# Patient Record
Sex: MALE | Race: WHITE | NOT HISPANIC OR LATINO | Employment: OTHER | URBAN - METROPOLITAN AREA
[De-identification: names, ages, dates, MRNs, and addresses within clinical notes are randomized per-mention and may not be internally consistent; named-entity substitution may affect disease eponyms.]

---

## 2017-02-26 ENCOUNTER — TRANSFERRED RECORDS (OUTPATIENT)
Dept: HEALTH INFORMATION MANAGEMENT | Facility: CLINIC | Age: 67
End: 2017-02-26

## 2017-02-28 ENCOUNTER — TRANSFERRED RECORDS (OUTPATIENT)
Dept: HEALTH INFORMATION MANAGEMENT | Facility: CLINIC | Age: 67
End: 2017-02-28

## 2017-03-15 ENCOUNTER — TRANSFERRED RECORDS (OUTPATIENT)
Dept: HEALTH INFORMATION MANAGEMENT | Facility: CLINIC | Age: 67
End: 2017-03-15

## 2017-05-17 ENCOUNTER — PRE VISIT (OUTPATIENT)
Dept: UROLOGY | Facility: CLINIC | Age: 67
End: 2017-05-17

## 2017-05-17 NOTE — TELEPHONE ENCOUNTER
Patient with prostate cancer coming in for a consult. Chart reviewed and all records available. No need for a call.

## 2017-05-19 PROCEDURE — 00000346 ZZHCL STATISTIC REVIEW OUTSIDE SLIDES TC 88321: Performed by: UROLOGY

## 2017-05-23 LAB — COPATH REPORT: NORMAL

## 2017-05-24 ENCOUNTER — ALLIED HEALTH/NURSE VISIT (OUTPATIENT)
Dept: UROLOGY | Facility: CLINIC | Age: 67
End: 2017-05-24

## 2017-05-24 ENCOUNTER — OFFICE VISIT (OUTPATIENT)
Dept: SURGERY | Facility: CLINIC | Age: 67
End: 2017-05-24

## 2017-05-24 ENCOUNTER — ANESTHESIA EVENT (OUTPATIENT)
Dept: SURGERY | Facility: CLINIC | Age: 67
DRG: 708 | End: 2017-05-24
Payer: MEDICARE

## 2017-05-24 ENCOUNTER — OFFICE VISIT (OUTPATIENT)
Dept: UROLOGY | Facility: CLINIC | Age: 67
End: 2017-05-24

## 2017-05-24 ENCOUNTER — ALLIED HEALTH/NURSE VISIT (OUTPATIENT)
Dept: SURGERY | Facility: CLINIC | Age: 67
End: 2017-05-24

## 2017-05-24 ENCOUNTER — HOSPITAL ENCOUNTER (OUTPATIENT)
Dept: MRI IMAGING | Facility: CLINIC | Age: 67
Discharge: HOME | End: 2017-05-24
Attending: UROLOGY | Admitting: UROLOGY
Payer: MEDICARE

## 2017-05-24 VITALS
WEIGHT: 229.5 LBS | DIASTOLIC BLOOD PRESSURE: 82 MMHG | HEART RATE: 102 BPM | BODY MASS INDEX: 31.08 KG/M2 | SYSTOLIC BLOOD PRESSURE: 152 MMHG | TEMPERATURE: 99.2 F | OXYGEN SATURATION: 92 % | HEIGHT: 72 IN | RESPIRATION RATE: 16 BRPM

## 2017-05-24 VITALS
HEIGHT: 72 IN | DIASTOLIC BLOOD PRESSURE: 89 MMHG | SYSTOLIC BLOOD PRESSURE: 150 MMHG | BODY MASS INDEX: 30.98 KG/M2 | WEIGHT: 228.7 LBS | HEART RATE: 78 BPM

## 2017-05-24 DIAGNOSIS — C61 MALIGNANT NEOPLASM OF PROSTATE (H): Primary | ICD-10-CM

## 2017-05-24 DIAGNOSIS — Z01.818 PREOP EXAMINATION: Primary | ICD-10-CM

## 2017-05-24 DIAGNOSIS — C61 MALIGNANT NEOPLASM OF PROSTATE (H): ICD-10-CM

## 2017-05-24 DIAGNOSIS — C61 PROSTATE CANCER (H): ICD-10-CM

## 2017-05-24 LAB
ALBUMIN UR-MCNC: NEGATIVE MG/DL
ANION GAP SERPL CALCULATED.3IONS-SCNC: 6 MMOL/L (ref 3–14)
APPEARANCE UR: CLEAR
BILIRUB UR QL STRIP: NEGATIVE
BUN SERPL-MCNC: 20 MG/DL (ref 7–30)
CALCIUM SERPL-MCNC: 9.7 MG/DL (ref 8.5–10.1)
CHLORIDE SERPL-SCNC: 105 MMOL/L (ref 94–109)
CO2 SERPL-SCNC: 29 MMOL/L (ref 20–32)
COLOR UR AUTO: YELLOW
CREAT SERPL-MCNC: 1.14 MG/DL (ref 0.66–1.25)
ERYTHROCYTE [DISTWIDTH] IN BLOOD BY AUTOMATED COUNT: 13.1 % (ref 10–15)
GFR SERPL CREATININE-BSD FRML MDRD: 64 ML/MIN/1.7M2
GLUCOSE SERPL-MCNC: 123 MG/DL (ref 70–99)
GLUCOSE UR STRIP-MCNC: 50 MG/DL
HCT VFR BLD AUTO: 47.3 % (ref 40–53)
HGB BLD-MCNC: 15.5 G/DL (ref 13.3–17.7)
HGB UR QL STRIP: NEGATIVE
KETONES UR STRIP-MCNC: NEGATIVE MG/DL
LEUKOCYTE ESTERASE UR QL STRIP: NEGATIVE
MCH RBC QN AUTO: 29.6 PG (ref 26.5–33)
MCHC RBC AUTO-ENTMCNC: 32.8 G/DL (ref 31.5–36.5)
MCV RBC AUTO: 90 FL (ref 78–100)
NITRATE UR QL: NEGATIVE
PH UR STRIP: 5 PH (ref 5–7)
PLATELET # BLD AUTO: 209 10E9/L (ref 150–450)
POTASSIUM SERPL-SCNC: 4.4 MMOL/L (ref 3.4–5.3)
RBC # BLD AUTO: 5.24 10E12/L (ref 4.4–5.9)
SODIUM SERPL-SCNC: 140 MMOL/L (ref 133–144)
SP GR UR STRIP: 1.02 (ref 1–1.03)
URN SPEC COLLECT METH UR: ABNORMAL
UROBILINOGEN UR STRIP-MCNC: 0 MG/DL (ref 0–2)
WBC # BLD AUTO: 9.1 10E9/L (ref 4–11)

## 2017-05-24 PROCEDURE — 25000128 H RX IP 250 OP 636: Performed by: UROLOGY

## 2017-05-24 PROCEDURE — A9585 GADOBUTROL INJECTION: HCPCS | Performed by: UROLOGY

## 2017-05-24 PROCEDURE — 72197 MRI PELVIS W/O & W/DYE: CPT

## 2017-05-24 RX ORDER — HEPARIN SODIUM 5000 [USP'U]/.5ML
5000 INJECTION, SOLUTION INTRAVENOUS; SUBCUTANEOUS ONCE
Status: CANCELLED | OUTPATIENT
Start: 2017-05-24 | End: 2017-05-24

## 2017-05-24 RX ORDER — LISINOPRIL/HYDROCHLOROTHIAZIDE 10-12.5 MG
1 TABLET ORAL EVERY MORNING
COMMUNITY
End: 2022-01-07 | Stop reason: ALTCHOICE

## 2017-05-24 RX ORDER — CEFAZOLIN SODIUM 1 G/3ML
1 INJECTION, POWDER, FOR SOLUTION INTRAMUSCULAR; INTRAVENOUS SEE ADMIN INSTRUCTIONS
Status: CANCELLED | OUTPATIENT
Start: 2017-05-24

## 2017-05-24 RX ORDER — GADOBUTROL 604.72 MG/ML
10 INJECTION INTRAVENOUS ONCE
Status: COMPLETED | OUTPATIENT
Start: 2017-05-24 | End: 2017-05-24

## 2017-05-24 RX ADMIN — GADOBUTROL 10 ML: 604.72 INJECTION INTRAVENOUS at 19:09

## 2017-05-24 RX ADMIN — SODIUM CHLORIDE 100 ML: 9 INJECTION, SOLUTION INTRAVENOUS at 19:09

## 2017-05-24 ASSESSMENT — PAIN SCALES - GENERAL: PAINLEVEL: NO PAIN (0)

## 2017-05-24 ASSESSMENT — LIFESTYLE VARIABLES: TOBACCO_USE: 1

## 2017-05-24 NOTE — PATIENT INSTRUCTIONS
Preparing for Your Surgery      Name:  Tunde Albarado   MRN:  [id number]   :  1950   Today's Date:  2017     Arriving for surgery:  Surgery date:  17  Surgery time:  7:45 am  Arrival time:  5:45 am    Please come to:   [facility] [address]  [address] [place]  [address]    -   parking is available in front of the hospital from 5:15 am to 8:00 pm    -  Stop at the Information Desk in the lobby    -   Inform the information person that you are here for surgery. An escort to 3c will be provided. If you would not like an escort, please proceed to 3C on the 3rd floor. [phone]   -  Bring your ID and insurance card.    What can I eat or drink?  -  Clear liquids the day prior to surgery.  -  You may have water, apple juice, BLACK coffee (NO creamer or nondairy creamer), or 7up/Sprite until 2 hours prior to your surgery. (5:45 am)    Which medicines can I take?      -  Do not bring your own medications to the hospital.        -  Stop Aspirin 1 week prior to surgery.        -  Follow Urology instructions regarding Ibuprofen. If no instructions given, NO Ibuprofen the day prior to surgery.  -  Do NOT take these medications in the morning, the day of surgery:  Lisinopril-Hydrochlorothiazide    -  Please take these medications the morning of surgery:  Acetaminophen (Tylenol) if needed    How do I prepare myself?        -  Fleets enema day of surgery prior to arrival.  -  Take two showers: one the night before surgery; and one the morning of surgery.         Use Scrubcare or Hibiclens to wash from neck down.  You may use your own shampoo and conditioner. No other hair products.   -  Do NOT use lotion, powder, deodorant, or antiperspirant the day of your surgery.  -  Do NOT wear any jewelry.    Questions or Concerns:  If you have questions or concerns, please call the  Preoperative Assessment Center, Monday-Friday 7AM-7PM:  [phone]    AFTER YOUR  SURGERY  Breathing exercises   Breathing exercises help you recover faster. Take deep breaths and let the air out slowly. This will:     Help you wake up after surgery.    Help prevent complications like pneumonia.  Preventing complications will help you go home sooner.   We may give you a breathing device (incentive spirometer) to encourage you to breathe deeply.   Nausea and vomiting   You may feel sick to your stomach after surgery; if so, let your nurse know.    Pain control:  After surgery, you may have pain. Our goal is to help you manage your pain. Pain medicine will help you feel comfortable enough to do activities that will help you heal.  These activities may include breathing exercises, walking and physical therapy.   To help your health care team treat your pain we will ask: 1) If you have pain  2) where it is located 3) describe your pain in your words  Methods of pain control include medications given by mouth, vein or by nerve block for some surgeries.  We may give you a pain control pump that will:  1) Deliver the medicine through a tube placed in your vein  2) Control the amount of medicine you receive  3) Allow you to push a button to deliver a dose of pain medicine  Sequential Compression Device (SCD) or Pneumo Boots:  You may need to wear SCD S on your legs or feet. These are wraps connected to a machine that pumps in air and releases it. The repeated pumping helps prevent blood clots from forming.

## 2017-05-24 NOTE — MR AVS SNAPSHOT
After Visit Summary   5/24/2017    Tunde Albarado    MRN: [id number]           Patient Information     Date Of Birth          1950        Visit Information        Provider Department      5/24/2017 9:40 AM Fanny Peace MD [facility] Urology and [facility] for Prostate and Urologic Cancers        Today's Diagnoses     Malignant neoplasm of prostate (H)    -  1       Follow-ups after your visit        Additional Services     PAC Visit Referral (For [facility] Only)       Does this visit require an Anesthesia consult?  Yes - Evaluate for medical necessity related to one of the following conditions:      H&P done by:  N/A and Other (Specify): PAC      Please be aware that coverage of these services is subject to the terms and limitations of your health insurance plan.  Call member services at your health plan with any benefit or coverage questions.      Please bring the following to your appointment:  >>   Any x-rays, CTs or MRIs which have been performed.  Contact the facility where they were done to arrange for  prior to your scheduled appointment.  Any new CT, MRI or other procedures ordered by your specialist must be performed at a [place] facility or coordinated by your clinic's referral office.    >>   List of current medications  >>   This referral request   >>   Any documents/labs given to you for this referral                  Follow-up notes from your care team     Return in about 4 weeks (around 6/21/2017).      Your next 10 appointments already scheduled     May 24, 2017  7:00 PM CDT   MR PROSTATE with UUMR2   [facility], [place], MRI ([facility])    [address] [facility] [id number] [phone]           Take your medicines as usual, unless your doctor tells you not to. Bring a list of your current medicines to your exam (including vitamins, minerals and over-the-counter drugs).  You will be given intravenous contrast for  this exam. To prepare:   The day before your exam, drink extra fluids at least six 8-ounce glasses (unless your doctor tells you to restrict your fluids).   Have a blood test (creatinine test) within 30 days of your exam. Go to your clinic or Diagnostic Imaging Department for this test.  The MRI machine uses a strong magnet. Please wear clothes without metal (snaps, zippers). A sweatsuit works well, or we may give you a hospital gown.  Please remove any body piercings and hair extensions before you arrive. You will also remove watches, jewelry, hairpins, wallets, dentures, partial dental plates and hearing aids. You may wear contact lenses, and you may be able to wear your rings. We have a safe place to keep your personal items, but it is safer to leave them at home.   **IMPORTANT** THE INSTRUCTIONS BELOW ARE ONLY FOR THOSE PATIENTS WHO HAVE BEEN TOLD THEY WILL RECEIVE SEDATION OR GENERAL ANESTHESIA DURING THEIR MRI PROCEDURE:  IF YOU WILL RECEIVE SEDATION (take medicine to help you relax during your exam):   You must get the medicine from your doctor before you arrive. Bring the medicine to the exam. Do not take it at home.   Arrive one hour early. Bring someone who can take you home after the test. Your medicine will make you sleepy. After the exam, you may not drive, take a bus or take a taxi by yourself.   No eating 8 hours before your exam. You may have clear liquids up until 4 hours before your exam. (Clear liquids include water, clear tea, black coffee and fruit juice without pulp.)  IF YOU WILL RECEIVE ANESTHESIA (be asleep for your exam):   Arrive 1 1/2 hours early. Bring someone who can take you home after the test. You may not drive, take a bus or take a taxi by yourself.   No eating 8 hours before your exam. You may have clear liquids up until 4 hours before your exam. (Clear liquids include water, clear tea, black coffee and fruit juice without pulp.)  Please call the Imaging Department at your exam  site with any questions.            2017  2:30 PM CDT   XR CYSTOGRAM with UCXR2, UC GIGU RAD   [facility]ray ([facility])    [address] [address] [phone]           Please bring a list of your current medicines to your exam. (Include vitamins, minerals and over-thecounter medicines.) Leave your valuables at home.  Tell your doctor if there is a chance you may be pregnant.  You do not need to do anything special for this exam.              Future tests that were ordered for you today     Open Future Orders        Priority Expected Expires Ordered    X-Ray Cystogram Routine 2017    MRI Prostate Routine 2017            Who to contact     Please call your clinic at [phone] to:    Ask questions about your health    Make or cancel appointments    Discuss your medicines    Learn about your test results    Speak to your doctor   If you have compliments or concerns about an experience at your clinic, or if you wish to file a complaint, please contact [facility] Physicians Patient Relations at [phone] or email us at [email or web address]         Additional Information About Your Visit        Ziffi Information     Ziffi is an electronic gateway that provides easy, online access to your medical records. With Ziffi, you can request a clinic appointment, read your test results, renew a prescription or communicate with your care team.     To sign up for [employer] visit the website at [email or web address]   You will be asked to enter the access code listed below, as well as some personal information. Please follow the directions to create your username and password.     Your access code is: [email or web address]  Expires: 2017  2:32 PM     Your access code will  in 90 days. If you need help or a new code, please contact your [facility]  [place] [facility] or call [phone] for assistance.        Care EveryWhere ID     This is your Care EveryWhere ID. This could be used by other organizations to access your [place] medical records  [id number]        Your Vitals Were     Pulse Height BMI (Body Mass Index)             78 1.829 m (6') 31.02 kg/m2          Blood Pressure from Last 3 Encounters:   05/24/17 150/89    Weight from Last 3 Encounters:   05/24/17 103.7 kg (228 lb 11.2 oz)              We Performed the Following     PAC Visit Referral (For [facility] Only)     Bonnie-Operative Worksheet        Primary Care Provider Office Phone # Fax #    Mayur Hinds [phone]       [place] [id number]        Thank you!     Thank you for choosing [facility] UROLOGY AND [facility] FOR PROSTATE AND UROLOGIC CANCERS  for your care. Our goal is always to provide you with excellent care. Hearing back from our patients is one way we can continue to improve our services. Please take a few minutes to complete the written survey that you may receive in the mail after your visit with us. Thank you!             Your Updated Medication List - Protect others around you: Learn how to safely use, store and throw away your medicines at www.disposemymeds.org.          This list is accurate as of: 5/24/17 11:26 AM.  Always use your most recent med list.                   Brand Name Dispense Instructions for use    ASPIRIN PO      Take 81 mg by mouth       lisinopril-hydrochlorothiazide 10-12.5 MG per tablet    PRINZIDE/ZESTORETIC     Take 1 tablet by mouth daily

## 2017-05-24 NOTE — MR AVS SNAPSHOT
After Visit Summary   2017    Tunde Albarado    MRN: [id number]           Patient Information     Date Of Birth          1950        Visit Information        Provider Department      2017 4:00 PM Rn, [facility] Preoperative Assessment Center        Care Instructions      Preparing for Your Surgery      Name:  Tunde Albarado   MRN:  [id number]   :  1950   Today's Date:  2017     Arriving for surgery:  Surgery date:  17  Surgery time:  7:45 am  Arrival time:  5:45 am    Please come to:   [facility] [address]  [address] [place]  [address]    -   parking is available in front of the hospital from 5:15 am to 8:00 pm    -  Stop at the Information Desk in the lobby    -   Inform the information person that you are here for surgery. An escort to 3c will be provided. If you would not like an escort, please proceed to 3C on the 3rd floor. [phone]   -  Bring your ID and insurance card.    What can I eat or drink?  -  Clear liquids the day prior to surgery.  -  You may have water, apple juice, BLACK coffee (NO creamer or nondairy creamer), or 7up/Sprite until 2 hours prior to your surgery. (5:45 am)    Which medicines can I take?      -  Do not bring your own medications to the hospital.        -  Stop Aspirin 1 week prior to surgery.        -  Follow Urology instructions regarding Ibuprofen. If no instructions given, NO Ibuprofen the day prior to surgery.  -  Do NOT take these medications in the morning, the day of surgery:  Lisinopril-Hydrochlorothiazide    -  Please take these medications the morning of surgery:  Acetaminophen (Tylenol) if needed    How do I prepare myself?        -  Fleets enema day of surgery prior to arrival.  -  Take two showers: one the night before surgery; and one the morning of surgery.         Use Scrubcare or Hibiclens to wash from neck down.  You may use your own shampoo and conditioner. No  other hair products.   -  Do NOT use lotion, powder, deodorant, or antiperspirant the day of your surgery.  -  Do NOT wear any jewelry.    Questions or Concerns:  If you have questions or concerns, please call the  Preoperative Assessment Center, Monday-Friday 7AM-7PM:  [phone]    AFTER YOUR SURGERY  Breathing exercises   Breathing exercises help you recover faster. Take deep breaths and let the air out slowly. This will:     Help you wake up after surgery.    Help prevent complications like pneumonia.  Preventing complications will help you go home sooner.   We may give you a breathing device (incentive spirometer) to encourage you to breathe deeply.   Nausea and vomiting   You may feel sick to your stomach after surgery; if so, let your nurse know.    Pain control:  After surgery, you may have pain. Our goal is to help you manage your pain. Pain medicine will help you feel comfortable enough to do activities that will help you heal.  These activities may include breathing exercises, walking and physical therapy.   To help your health care team treat your pain we will ask: 1) If you have pain  2) where it is located 3) describe your pain in your words  Methods of pain control include medications given by mouth, vein or by nerve block for some surgeries.  We may give you a pain control pump that will:  1) Deliver the medicine through a tube placed in your vein  2) Control the amount of medicine you receive  3) Allow you to push a button to deliver a dose of pain medicine  Sequential Compression Device (SCD) or Pneumo Boots:  You may need to wear SCD S on your legs or feet. These are wraps connected to a machine that pumps in air and releases it. The repeated pumping helps prevent blood clots from forming.                   Follow-ups after your visit        Your next 10 appointments already scheduled     May 24, 2017  4:00 PM CDT   (Arrive by 3:45 PM)   PAC RN ASSESSMENT with  Pac Rn   [facility] Preoperative  Assessment Center ([facility] [place])    [address] [facility]  4th Floor  [facility] [address]   [phone]            May 24, 2017  4:30 PM CDT   (Arrive by 4:15 PM)   PAC Anesthesia Consult with  Pac Anesthesiologist   [facility] Preoperative Assessment Center ([facility] [place])    [address] [facility]  [address] [facility] [address]   [phone]            May 24, 2017  7:00 PM CDT   MR PROSTATE with UUMR2   [facility] [place])    [address] [facility] [address]   [phone]           Take your medicines as usual, unless your doctor tells you not to. Bring a list of your current medicines to your exam (including vitamins, minerals and over-the-counter drugs).  You will be given intravenous contrast for this exam. To prepare:   The day before your exam, drink extra fluids at least six 8-ounce glasses (unless your doctor tells you to restrict your fluids).   Have a blood test (creatinine test) within 30 days of your exam. Go to your clinic or Diagnostic Imaging Department for this test.  The MRI machine uses a strong magnet. Please wear clothes without metal (snaps, zippers). A sweatsuit works well, or we may give you a hospital gown.  Please remove any body piercings and hair extensions before you arrive. You will also remove watches, jewelry, hairpins, wallets, dentures, partial dental plates and hearing aids. You may wear contact lenses, and you may be able to wear your rings. We have a safe place to keep your personal items, but it is safer to leave them at home.   **IMPORTANT** THE INSTRUCTIONS BELOW ARE ONLY FOR THOSE PATIENTS WHO HAVE BEEN TOLD THEY WILL RECEIVE SEDATION OR GENERAL ANESTHESIA DURING THEIR MRI PROCEDURE:  IF YOU WILL RECEIVE SEDATION (take medicine to help you relax during your exam):   You must get the medicine from your doctor before you arrive. Bring the  medicine to the exam. Do not take it at home.   Arrive one hour early. Bring someone who can take you home after the test. Your medicine will make you sleepy. After the exam, you may not drive, take a bus or take a taxi by yourself.   No eating 8 hours before your exam. You may have clear liquids up until 4 hours before your exam. (Clear liquids include water, clear tea, black coffee and fruit juice without pulp.)  IF YOU WILL RECEIVE ANESTHESIA (be asleep for your exam):   Arrive 1 1/2 hours early. Bring someone who can take you home after the test. You may not drive, take a bus or take a taxi by yourself.   No eating 8 hours before your exam. You may have clear liquids up until 4 hours before your exam. (Clear liquids include water, clear tea, black coffee and fruit juice without pulp.)  Please call the Imaging Department at your exam site with any questions.            Jun 02, 2017   Procedure with Fanny Peace MD   [facility], [place], Same Day Surgery (--)    [address] [facility] [id number] [phone]            Jun 14, 2017  2:30 PM CDT   XR CYSTOGRAM with UCXR2, UC GIGU RAD   [facility] [place] Xray ([facility])    [address] [address] [phone]           Please bring a list of your current medicines to your exam. (Include vitamins, minerals and over-thecounter medicines.) Leave your valuables at home.  Tell your doctor if there is a chance you may be pregnant.  You do not need to do anything special for this exam.            Jun 14, 2017  3:30 PM CDT   (Arrive by 3:15 PM)   Post-Op with Bessy Echeverria MD   [facility] Urology and [facility] for Prostate and Urologic Cancers ([facility] [place])    [address] [facility]  4th [facility] [address] [phone]              Future tests that were ordered for you today     Open Future Orders        Priority Expected Expires Ordered    X-Ray Cystogram Routine  2017    MRI Prostate Routine 2017            Who to contact     Please call your clinic at [phone] to:    Ask questions about your health    Make or cancel appointments    Discuss your medicines    Learn about your test results    Speak to your doctor   If you have compliments or concerns about an experience at your clinic, or if you wish to file a complaint, please contact [facility] Physicians Patient Relations at [phone] or email us at [email or web address]         Additional Information About Your Visit        SocialbombharSkeeble Information     [employer] is an electronic gateway that provides easy, online access to your medical records. With [employer], you can request a clinic appointment, read your test results, renew a prescription or communicate with your care team.     To sign up for [employer] visit the website at [email or web address]   You will be asked to enter the access code listed below, as well as some personal information. Please follow the directions to create your username and password.     Your access code is: [email or web address]  Expires: 2017  2:32 PM     Your access code will  in 90 days. If you need help or a new code, please contact your [facility] Clinic or call [phone] for assistance.        Care EveryWhere ID     This is your Care EveryWhere ID. This could be used by other organizations to access your [place] medical records  [id number]         Blood Pressure from Last 3 Encounters:   17 152/82   17 150/89    Weight from Last 3 Encounters:   17 104.1 kg (229 lb 8 oz)   17 103.7 kg (228 lb 11.2 oz)              Today, you had the following     No orders found for display       Primary Care Provider Office Phone # Fax #    Mayur Hinds [phone]       [facility] [address] [facility] [address]        Thank you!     Thank you for  [facility] PREOPERATIVE ASSESSMENT CENTER  for your care. Our goal is always to provide you with excellent care. Hearing back from our patients is one way we can continue to improve our services. Please take a few minutes to complete the written survey that you may receive in the mail after your visit with us. Thank you!             Your Updated Medication List - Protect others around you: Learn how to safely use, store and throw away your medicines at www.disposemymeds.org.          This list is accurate as of: 5/24/17  3:26 PM.  Always use your most recent med list.                   Brand Name Dispense Instructions for use    ASPIRIN PO      Take 81 mg by mouth every morning       lisinopril-hydrochlorothiazide 10-12.5 MG per tablet    PRINZIDE/ZESTORETIC     Take 1 tablet by mouth every morning       SIMVASTATIN PO      Take 20 mg by mouth At Bedtime

## 2017-05-24 NOTE — H&P
Pre-Operative H & P     CC:  Preoperative exam to assess for increased cardiopulmonary risk while undergoing surgery and anesthesia.    Date of Encounter: 5/24/2017  Primary Care Physician:  Mayur Hinds  Tunde Albarado is a 67 year old male who presents for pre-operative H & P in preparation for Davinci assisted radical prostatectomy, bilateral pelvic lymphadenectomy with Dr. Peace on 6/2/17 at [facility]. History is obtained from the patient.     Patient with newly diagnosed prostate cancer, biopsy proven after rising PSA. Consult with Dr. Peace with above procedure now planned.    Past Medical History  Past Medical History:   Diagnosis Date     HLD (hyperlipidemia)      Hypertension      PONV (postoperative nausea and vomiting)      Prostate cancer (H)        Past Surgical History  Past Surgical History:   Procedure Laterality Date     COLONOSCOPY       HERNIA REPAIR, INGUINAL RT/LT      open       Hx of Blood transfusions/reactions: Denies.     Hx of abnormal bleeding or anti-platelet use: ASA 81 mg daily    Menstrual history: No LMP for male patient.    Steroid use in the last year: Denies.     Personal or FH with difficulty with Anesthesia:  PONV.    Prior to Admission Medications  Current Outpatient Prescriptions   Medication Sig Dispense Refill     lisinopril-hydrochlorothiazide (PRINZIDE/ZESTORETIC) 10-12.5 MG per tablet Take 1 tablet by mouth every morning        ASPIRIN PO Take 81 mg by mouth every morning        SIMVASTATIN PO Take 20 mg by mouth At Bedtime         Allergies  No Known Allergies    Social History  Social History     Social History     Marital status:      Spouse name: N/A     Number of children: N/A     Years of education: N/A     Occupational History     Not on file.     Social History Main Topics     Smoking status: Former Smoker     Packs/day: 0.50     Years: 32.00     Types: Cigarettes     Start date: 1968     Quit date:  "2000     Smokeless tobacco: Not on file     Alcohol use Yes      Comment: Social     Drug use: No     Sexual activity: Not on file     Other Topics Concern     Not on file     Social History Narrative       Family History  Family History   Problem Relation Age of Onset     Prostate Cancer Brother      Pancreatic Cancer Father        Review of Systems  The complete review of systems is negative other than noted in the HPI or here.   Constitutional: Denies fever, chills, weight loss.  Skin: Chronic skin lesions. Occasional bleeding. Has never had excision by Derm.   HEENT: Wears glasses for vision.  Respiratory: Denies cough or shortness of breath. Denies concern for SARAI.  CV: Denies chest pain or irregular HR. Good activity tolerance.  GI: Denies abdominal pain or bowel issues.  : Denies dysuria.    Temp: 99.2  F (37.3  C) Temp src: Oral BP: 152/82 Pulse: 102   Resp: 16 SpO2: 92 %         229 lbs 8 oz  6' 0\"   Body mass index is 31.13 kg/(m^2).       Physical Exam  Constitutional: Awake, alert, cooperative, no apparent distress, and appears stated age. Accompanied by son.  Eyes: Pupils equal, round and reactive to light, extra ocular muscles intact, sclera clear, conjunctiva normal. Glasses on.  HENT: Normocephalic, oral pharynx with moist mucus membranes, good dentition. No goiter appreciated.   Respiratory: Clear to auscultation bilaterally, no crackles or wheezing. No cough or obvious dyspnea.  Cardiovascular: Regular rate and rhythm, normal S1 and S2, and no murmur noted. Carotids, no bruits. No edema. Palpable pulses to radial  DP and PT arteries.   GI: Normal bowel sounds, soft, non-distended, non-tender, no masses palpated. Difficult exam due to obese abdomen. Patient's abdomen is covered with dry plaque-like skin lesions which he reports as chronic. Very rare bleeding. Dr. Peace examined abdomen today also.  Lymph/Hematologic: No cervical lymphadenopathy and no supraclavicular " lymphadenopathy.  Genitourinary:  Deferred.  Skin: Warm and dry. Face gracia.  Musculoskeletal: Mildly limited neck extension. There is no redness, warmth, or swelling of the joints. Gross motor strength is normal.    Neurologic: Awake, alert, oriented to name, place and time. Cranial nerves II-XII are grossly intact. Gait is normal.   Neuropsychiatric: Calm, cooperative. Normal affect.     Labs: (personally reviewed)  Lab Results   Component Value Date    WBC 9.1 2017     Lab Results   Component Value Date    RBC 5.24 2017     Lab Results   Component Value Date    HGB 15.5 2017     Lab Results   Component Value Date    HCT 47.3 2017     Lab Results   Component Value Date    MCV 90 2017     Lab Results   Component Value Date    MCH 29.6 2017     Lab Results   Component Value Date    MCHC 32.8 2017     Lab Results   Component Value Date    RDW 13.1 2017     Lab Results   Component Value Date     2017     Last Basic Metabolic Panel:  Lab Results   Component Value Date     2017      Lab Results   Component Value Date    POTASSIUM 4.4 2017     Lab Results   Component Value Date    CHLORIDE 105 2017     Lab Results   Component Value Date    ISABELA 9.7 2017     Lab Results   Component Value Date    CO2 29 2017     Lab Results   Component Value Date    BUN 20 2017     Lab Results   Component Value Date    CR 1.14 2017     Lab Results   Component Value Date     2017     UA RESULTS: 17  Recent Labs   Lab Test  17   1733   COLOR  Yellow   APPEARANCE  Clear   URINEGLC  50*   URINEBILI  Negative   URINEKETONE  Negative   SG  1.019   UBLD  Negative   URINEPH  5.0   PROTEIN  Negative   NITRITE  Negative   LEUKEST  Negative     EKG: Personally reviewed but formal cardiology read pendin17 Normal sinus rhythm    Outside records reviewed from: Care Everywhere    ASSESSMENT and PLAN  Tunde Albarado is  a 67 year old male scheduled to undergo Davinci assisted radical prostatectomy, bilateral pelvic lymphadenectomy on 6/2/17 by Dr. Peace. He has the following specific operative considerations:   - RCRI : No serious cardiac risks.   - Anesthesia considerations:  Refer to PAC assessment in anesthesia records  - VTE risk: 3%  - SARAI # of risks 4/8 = Intermediate risk  - Risk of PONV score = 1.  If > 2, anti-emetic intervention recommended.     --Newly diagnosed prostate cancer. Above procedure now planned.   --PONV. Significant history. Final decisions regarding prophylaxis by Anesthesia on DOS.   --HLD. Simvastatin. HTN. Will hold Lisinopril on DOS. ASA 81 mg with planned 7 day hold. EKG NSR. Good exercise tolerance.   --Former smoker. 16 pack years. Denies pulmonary symptoms or concern for SARAI.   --Pain management. Nerve block could be considered if appropriate for patient's procedure. Final counseling and decisions by regional team on DOS.   --History of difficult IV stick with patient becoming lightheaded. Patient should be laying down for IV start.     Type and screen drawn today.    Arrival time, NPO, shower and medication instructions provided by nursing staff today. Preparing For Your Surgery handout given.    Patient was discussed with Dr Swain.    NIGEL Eden CNS  Preoperative Assessment Center  [facility]  Phone: [phone]  Fax: [phone]

## 2017-05-24 NOTE — PROGRESS NOTES
Urology Consult    Name: Tunde Albarado    MRN: [id number]   YOB: 1950       We were asked to see Tunde Albarado for evaluation and treatment of the following chief complaint.        Chief Complaint:   Recent prostate cancer diagnosis       History of Present Illness:   Mr Fuentes is a 67 year old man with a past medical history of previous tobacco use, hypertension and hyperlipidemia along with a family history of prostate cancer (brother diagnosed in mid 60s) who presents for evaluation of recently diagnosed intermediate risk prostate cancer (Ami 3+4, PSA 6.2, cT1c).  The patient underwent biopsy in February 2017 in the setting of rising PSA (3.8 2013, 5.8 2016, 6.2 2017).    He reports erectile dysfunction characterized by difficulty achieving and maintaining an erection.  He is not currently sexually active.    He denies bothersome LUTS.             Past Medical History:   Tobacco -(30-40 pack year history) quit in 2000   HTN  HLD         Past Surgical History:   Open right inguianl hernia repair         Social History:   Former smoker   - 2004   Social smoker   Semi retired  and          Family History:   No family history on file.         Allergies:   No Known Allergies         Medications:     Current Outpatient Prescriptions   Medication Sig     lisinopril-hydrochlorothiazide (PRINZIDE/ZESTORETIC) 10-12.5 MG per tablet Take 1 tablet by mouth daily     ASPIRIN PO Take 81 mg by mouth     No current facility-administered medications for this visit.              Review of Systems:    ROS: 10 point ROS neg other than the symptoms noted above in the HPI           Physical Exam:   VS:  T: Data Unavailable    HR: 78    BP: 150/89    RR: Data Unavailable   GEN:  AOx3.  NAD.    CV:  RRR  LUNGS: Non-labored breathing.   BACK:  No midline or CVA tenderness.  ABD:  Soft.  NT.  ND.  No rebound or guarding.  No masses.  :  Normal penile shaft.  Testicles descended  bilaterally, no nodules or tenderness.  No inguinal hernias.  BOOKER:  Prostate smooth, symmetric, no nodules. Medium size  EXT:  Warm, well perfused.              Data:         a. MSK Nomogram  i. Cancer specific survival after prostatectomy   1. 10 year 99%, 15 year 99%  ii. Progression free probability  1. 5 year 85%, 10 year 75%  iii. Extent  1. Organ confined - 38%  2. Extracapsular - 61%  3. LN - 4%  4. SV - 5%         Impression and Plan:      Tunde Albarado is a 67 year old male with recently diagnosed intermediate risk prostate cancer  We reviewed the natural history of prostate cancer along with available treatment options.  In light of the patient's age body habitus and overall good health, he was deemed a good surgical candidate.  After discussing the risks and benefits of prostatectomy along with other options, the patient opted to proceed with RALP with bilateral lymph node dissection.          This patient's exam findings, labs, and imaging discussed with urology staff surgeon Dr. Peace, who developed the treatment plan.    Brendan Jones MD  Urology Resident    Patient seen and examined with the resident.  Visit time 30 minutes and >50% spent in counseling.  I agree with the resident's note and plan of care.       Fanny Peace MD  Urology Staff       Answers for HPI/ROS submitted by the patient on 5/24/2017   General Symptoms: No  Skin Symptoms: No  HENT Symptoms: No  EYE SYMPTOMS: No  HEART SYMPTOMS: No  LUNG SYMPTOMS: No  INTESTINAL SYMPTOMS: No  URINARY SYMPTOMS: No  REPRODUCTIVE SYMPTOMS: No  SKELETAL SYMPTOMS: No  BLOOD SYMPTOMS: No  NERVOUS SYSTEM SYMPTOMS: No  MENTAL HEALTH SYMPTOMS: No

## 2017-05-24 NOTE — LETTER
5/24/2017       RE: Tunde Albarado  [address]  [facility] [address]     Dear Colleague,    Thank you for referring your patient, Tunde Albarado, to the [facility] PROST[facility] UROLOGIC CANCERS at [facility]. Please see a copy of my visit note below.    Urology Consult    Name: Tunde Albarado    MRN: [id number]   YOB: 1950       We were asked to see Tunde Albarado for evaluation and treatment of the following chief complaint.        Chief Complaint:   Recent prostate cancer diagnosis       History of Present Illness:   Mr Fuentes is a 67 year old man with a past medical history of previous tobacco use, hypertension and hyperlipidemia along with a family history of prostate cancer (brother diagnosed in mid 60s) who presents for evaluation of recently diagnosed intermediate risk prostate cancer (Ami 3+4, PSA 6.2, cT1c).  The patient underwent biopsy in February 2017 in the setting of rising PSA (3.8 2013, 5.8 2016, 6.2 2017).    He reports erectile dysfunction characterized by difficulty achieving and maintaining an erection.  He is not currently sexually active.    He denies bothersome LUTS.             Past Medical History:   Tobacco -(30-40 pack year history) quit in 2000   HTN  HLD         Past Surgical History:   Open right inguianl hernia repair         Social History:   Former smoker   - 2004   Social smoker   Semi retired  and          Family History:   No family history on file.         Allergies:   No Known Allergies         Medications:     Current Outpatient Prescriptions   Medication Sig     lisinopril-hydrochlorothiazide (PRINZIDE/ZESTORETIC) 10-12.5 MG per tablet Take 1 tablet by mouth daily     ASPIRIN PO Take 81 mg by mouth     No current facility-administered medications for this visit.              Review of Systems:    ROS: 10 point ROS neg other than the symptoms noted above in the HPI            Physical Exam:   VS:  T: Data Unavailable    HR: 78    BP: 150/89    RR: Data Unavailable   GEN:  AOx3.  NAD.    CV:  RRR  LUNGS: Non-labored breathing.   BACK:  No midline or CVA tenderness.  ABD:  Soft.  NT.  ND.  No rebound or guarding.  No masses.  :  Normal penile shaft.  Testicles descended bilaterally, no nodules or tenderness.  No inguinal hernias.  BOOKER:  Prostate smooth, symmetric, no nodules. Medium size  EXT:  Warm, well perfused.              Data:         a. MSK Nomogram  i. Cancer specific survival after prostatectomy   1. 10 year 99%, 15 year 99%  ii. Progression free probability  1. 5 year 85%, 10 year 75%  iii. Extent  1. Organ confined - 38%  2. Extracapsular - 61%  3. LN - 4%  4. SV - 5%         Impression and Plan:      Tunde Albarado is a 67 year old male with recently diagnosed intermediate risk prostate cancer  We reviewed the natural history of prostate cancer along with available treatment options.  In light of the patient's age body habitus and overall good health, he was deemed a good surgical candidate.  After discussing the risks and benefits of prostatectomy along with other options, the patient opted to proceed with RALP with bilateral lymph node dissection.          This patient's exam findings, labs, and imaging discussed with urology staff surgeon Dr. Peace, who developed the treatment plan.    Brendan Jones MD  Urology Resident    Patient seen and examined with the resident.  Visit time 30 minutes and >50% spent in counseling.  I agree with the resident's note and plan of care.       Fanny Peace MD  Urology Staff       Again, thank you for allowing me to participate in the care of your patient.      Sincerely,    Fanny Peace MD

## 2017-05-24 NOTE — NURSING NOTE
Met with patient today in regards to prostate cancer.  Introduced myself as patient's  Care Coordinator and  for Dr. Peace.  Prostate binder given to patient and contents gone over.  All questions and concerns were answered.  Business card with my information given to patient.   Encouraged to call back with any questions or concerns.  Patient states understanding.    Bruna Mcgregor RN       Pre Op Teaching Flowsheet       Pre and Post op Patient Education  Relevant Diagnosis:  Prostate cancer  Surgical procedure:  Robotic radical prostatectomy bilateral pelvic lymphadenectomy  Teaching Topic:  Pre and post op teaching  Person Involved in teaching: Tunde Albarado    Motivation Level:  Asks Questions: Yes  Eager to Learn:  Yes  Cooperative: Yes  Receptive (willing/able to accept information):  Yes    Patient demonstrates understanding of the following:  Date of surgery:  6/2/17  Location of surgery:  [address] [place] [address]  History and Physical and any other testing necessary prior to surgery: Yes  Required time line for completion of History and Physical and any pre-op testing: Yes    Patient demonstrates understanding of the following:  Pre-op bowel prep:  None needed  Pre-op showering/scrub information with PCMX Soap: Yes  Blood thinner medications discussed and when to stop (if applicable):  Yes      Infection Prevention:   Patient demonstrates understanding of the following:  Surgical procedure site care taught: at time of discharge  Signs and symptoms of infection taught:  Yes      Post-op follow-up:  Discussed how to contact the hospital, nurse, and clinic scheduling staff if necessary.    Instructional materials used/given/mailed:  [facility] Booklet, post op teaching sheet, Map, Soap, and arrival/location information.    Surgical instructions packet given to patient in office:  Yes.

## 2017-05-24 NOTE — ANESTHESIA PREPROCEDURE EVALUATION
Anesthesia Evaluation     . Pt has had prior anesthetic. Type: General and MAC    History of anesthetic complications   - PONV        ROS/MED HX    ENT/Pulmonary: Comment: 16 pack year history    (+)SARAI risk factors hypertension, obese, tobacco use, Past use , . .    Neurologic:  - neg neurologic ROS     Cardiovascular:     (+) Dyslipidemia, hypertension----. Taking blood thinners Pt has received instructions: Instructions Given to patient: Planned 7 day hold for surgery. . . :. . Previous cardiac testing date:results:date: results:ECG reviewed date:5/24/17 results:NSR date: results:          METS/Exercise Tolerance:  >4 METS   Hematologic:  - neg hematologic  ROS       Musculoskeletal:  - neg musculoskeletal ROS       GI/Hepatic:  - neg GI/hepatic ROS       Renal/Genitourinary:  - ROS Renal section negative       Endo:     (+) Obesity, .      Psychiatric:  - neg psychiatric ROS       Infectious Disease:  - neg infectious disease ROS       Malignancy:   (+) Malignancy History of Prostate  Prostate CA Active status post Surgery,         Other:    (+) No chance of pregnancy C-spine cleared: N/A, no H/O Chronic Pain,no other significant disability                    Physical Exam  Normal systems: dental    Airway   Mallampati: II  TM distance: >3 FB  Neck ROM: limited    Dental     Cardiovascular   Rhythm and rate: regular and normal      Pulmonary    breath sounds clear to auscultation    Other findings: For further details of assessment, testing, and physical exam please see H and P completed on same date.           PAC Discussion and Assessment    ASA Classification: 2  Case is suitable for: [place]  Anesthetic techniques and relevant risks discussed: GA  Invasive monitoring and risk discussed: No  Types:   Possibility and Risk of blood transfusion discussed: Yes  NPO instructions given:   Additional anesthetic preparation and risks discussed:   Needs early admission to pre-op area:   Other:     PAC Resident/NP  Anesthesia Assessment:  Tunde Albarado is a 67 year old male scheduled to undergo Davinci assisted radical prostatectomy, bilateral pelvic lymphadenectomy on 6/2/17 by Dr. Peace. He has the following specific operative considerations:   - RCRI : No serious cardiac risks.   - VTE risk: 3%  - SARAI # of risks 4/8 = Intermediate risk  - Risk of PONV score = 1.  If > 2, anti-emetic intervention recommended.    Last GA for hernia several years ago.      --Newly diagnosed prostate cancer. Above procedure now planned.   --PONV. Significant history. Final decisions regarding prophylaxis by Anesthesia on DOS.   --HLD. Simvastatin. HTN. Will hold Lisinopril on DOS. ASA 81 mg with planned 7 day hold. EKG NSR. Good exercise tolerance.   --Former smoker. 16 pack years. Denies pulmonary symptoms or concern for SARAI.   --Pain management. Nerve block could be considered if appropriate for patient's procedure. Final counseling and decisions by regional team on DOS.   --History of difficult IV stick with patient becoming lightheaded. Patient should be laying down for IV start.     Type and screen drawn today.        Patient was discussed with Dr Swain.        Reviewed and Signed by PAC Mid-Level Provider/Resident  Mid-Level Provider/Resident: NIGEL Sharpe, CNS  Date: 5/24/17  Time: 4:14pm    Attending Anesthesiologist Anesthesia Assessment:  Pt seen qs answered.    Reviewed and Signed by PAC Anesthesiologist  Anesthesiologist: dang  Date: 5.31.17  Time:   Pass/Fail:   Disposition:     PAC Pharmacist Assessment:        Pharmacist:   Date:   Time:      Anesthesia Plan      History & Physical Review  History and physical reviewed and following examination; no interval change.    ASA Status:  3 .    NPO Status:  > 8 hours    Plan for General and ETT with Intravenous induction. Maintenance will be Balanced.    PONV prophylaxis:  Ondansetron (or other 5HT-3) and Other (See comment)  Additional equipment: 2nd IV      Postoperative  Care  Postoperative pain management:  IV analgesics and Multi-modal analgesia.      Consents  Anesthetic plan, risks, benefits and alternatives discussed with:  Patient..                          .

## 2017-05-24 NOTE — MR AVS SNAPSHOT
After Visit Summary   5/24/2017    Tunde Albarado    MRN: [id number]           Patient Information     Date Of Birth          1950        Visit Information        Provider Department      5/24/2017 11:20 AM Nurse, [facility]        Today's Diagnoses     Malignant neoplasm of prostate (H)    -  1       Follow-ups after your visit        Your next 10 appointments already scheduled     Jun 14, 2017  2:30 PM CDT   XR CYSTOGRAM with UCXR2, PIA GIGU RAD   [facility] ([facility])    [address] [place] [id number] [phone]           Please bring a list of your current medicines to your exam. (Include vitamins, minerals and over-thecounter medicines.) Leave your valuables at home.  Tell your doctor if there is a chance you may be pregnant.  You do not need to do anything special for this exam.              Future tests that were ordered for you today     Open Future Orders        Priority Expected Expires Ordered    X-Ray Cystogram Routine 5/24/2017 5/24/2018 5/24/2017    MRI Prostate Routine 5/24/2017 5/24/2018 5/24/2017            Who to contact     Please call your clinic at [phone] to:    Ask questions about your health    Make or cancel appointments    Discuss your medicines    Learn about your test results    Speak to your doctor   If you have compliments or concerns about an experience at your clinic, or if you wish to file a complaint, please contact [facility] Patient Relations at [phone] or email us at [email or web address]         Additional Information About Your Visit        MyChart Information     Invenshure is an electronic gateway that provides easy, online access to your medical records. With Invenshure, you can request a clinic appointment, read your test results, renew a prescription or communicate with  your care team.     To sign up for MyChart visit the website at [email or web address]   You will be asked to enter the access code listed below, as well as some personal information. Please follow the directions to create your username and password.     Your access code is: [email or web address]  Expires: 2017  2:32 PM     Your access code will  in 90 days. If you need help or a new code, please contact your [facility] or call [phone] for assistance.        Care EveryWhere ID     This is your Care EveryWhere ID. This could be used by other organizations to access your [place] medical records  [id number]         Blood Pressure from Last 3 Encounters:   17 150/89    Weight from Last 3 Encounters:   17 103.7 kg (228 lb 11.2 oz)              Today, you had the following     No orders found for display       Primary Care Provider Office Phone # Fax #    Mayur SPANN Lizet [phone]       [place] [address]        Thank you!     Thank you for choosing [facility] AND [facility]IC [facility]CERS  for your care. Our goal is always to provide you with excellent care. Hearing back from our patients is one way we can continue to improve our services. Please take a few minutes to complete the written survey that you may receive in the mail after your visit with us. Thank you!             Your Updated Medication List - Protect others around you: Learn how to safely use, store and throw away your medicines at www.disposemymeds.org.          This list is accurate as of: 17 11:25 AM.  Always use your most recent med list.                   Brand Name Dispense Instructions for use    ASPIRIN PO      Take 81 mg by mouth       lisinopril-hydrochlorothiazide 10-12.5 MG per tablet    PRINZIDE/ZESTORETIC     Take 1 tablet by mouth daily

## 2017-05-24 NOTE — NURSING NOTE
Chief Complaint   Patient presents with     Consult     prostate cancer        Bianca Del Rosario MA

## 2017-05-25 LAB — INTERPRETATION ECG - MUSE: NORMAL

## 2017-06-02 ENCOUNTER — ANESTHESIA (OUTPATIENT)
Dept: SURGERY | Facility: CLINIC | Age: 67
DRG: 708 | End: 2017-06-02
Payer: MEDICARE

## 2017-06-02 ENCOUNTER — HOSPITAL ENCOUNTER (INPATIENT)
Facility: CLINIC | Age: 67
LOS: 1 days | Discharge: HOME | DRG: 708 | End: 2017-06-03
Attending: UROLOGY | Admitting: UROLOGY
Payer: MEDICARE

## 2017-06-02 DIAGNOSIS — C61 MALIGNANT NEOPLASM OF PROSTATE (H): ICD-10-CM

## 2017-06-02 DIAGNOSIS — N32.89 BLADDER SPASMS: Primary | ICD-10-CM

## 2017-06-02 LAB
ABO + RH BLD: NORMAL
ABO + RH BLD: NORMAL
ANION GAP SERPL CALCULATED.3IONS-SCNC: 8 MMOL/L (ref 3–14)
BLD GP AB SCN SERPL QL: NORMAL
BLOOD BANK CMNT PATIENT-IMP: NORMAL
BLOOD BANK CMNT PATIENT-IMP: NORMAL
BUN SERPL-MCNC: 20 MG/DL (ref 7–30)
CALCIUM SERPL-MCNC: 8.4 MG/DL (ref 8.5–10.1)
CHLORIDE SERPL-SCNC: 107 MMOL/L (ref 94–109)
CO2 SERPL-SCNC: 25 MMOL/L (ref 20–32)
CREAT SERPL-MCNC: 1.31 MG/DL (ref 0.66–1.25)
ERYTHROCYTE [DISTWIDTH] IN BLOOD BY AUTOMATED COUNT: 13.4 % (ref 10–15)
GFR SERPL CREATININE-BSD FRML MDRD: 54 ML/MIN/1.7M2
GLUCOSE SERPL-MCNC: 212 MG/DL (ref 70–99)
HCT VFR BLD AUTO: 36.9 % (ref 40–53)
HGB BLD-MCNC: 11.6 G/DL (ref 13.3–17.7)
MCH RBC QN AUTO: 28.8 PG (ref 26.5–33)
MCHC RBC AUTO-ENTMCNC: 31.4 G/DL (ref 31.5–36.5)
MCV RBC AUTO: 92 FL (ref 78–100)
PLATELET # BLD AUTO: 179 10E9/L (ref 150–450)
POTASSIUM SERPL-SCNC: 4.6 MMOL/L (ref 3.4–5.3)
RBC # BLD AUTO: 4.03 10E12/L (ref 4.4–5.9)
SODIUM SERPL-SCNC: 140 MMOL/L (ref 133–144)
SPECIMEN EXP DATE BLD: NORMAL
WBC # BLD AUTO: 12 10E9/L (ref 4–11)

## 2017-06-02 PROCEDURE — 88304 TISSUE EXAM BY PATHOLOGIST: CPT | Performed by: UROLOGY

## 2017-06-02 PROCEDURE — S0020 INJECTION, BUPIVICAINE HYDRO: HCPCS | Performed by: UROLOGY

## 2017-06-02 PROCEDURE — 25000132 ZZH RX MED GY IP 250 OP 250 PS 637: Mod: GY | Performed by: UROLOGY

## 2017-06-02 PROCEDURE — 37000008 ZZH ANESTHESIA TECHNICAL FEE, 1ST 30 MIN: Performed by: UROLOGY

## 2017-06-02 PROCEDURE — 25000566 ZZH SEVOFLURANE, EA 15 MIN: Performed by: UROLOGY

## 2017-06-02 PROCEDURE — 25000128 H RX IP 250 OP 636: Performed by: UROLOGY

## 2017-06-02 PROCEDURE — 0VT04ZZ RESECTION OF PROSTATE, PERCUTANEOUS ENDOSCOPIC APPROACH: ICD-10-PCS | Performed by: UROLOGY

## 2017-06-02 PROCEDURE — 71000015 ZZH RECOVERY PHASE 1 LEVEL 2 EA ADDTL HR: Performed by: UROLOGY

## 2017-06-02 PROCEDURE — A9270 NON-COVERED ITEM OR SERVICE: HCPCS | Mod: GY | Performed by: UROLOGY

## 2017-06-02 PROCEDURE — 85027 COMPLETE CBC AUTOMATED: CPT | Performed by: UROLOGY

## 2017-06-02 PROCEDURE — 25000125 ZZHC RX 250: Performed by: UROLOGY

## 2017-06-02 PROCEDURE — 25000125 ZZHC RX 250: Performed by: NURSE ANESTHETIST, CERTIFIED REGISTERED

## 2017-06-02 PROCEDURE — 88331 PATH CONSLTJ SURG 1 BLK 1SPC: CPT | Performed by: UROLOGY

## 2017-06-02 PROCEDURE — C9399 UNCLASSIFIED DRUGS OR BIOLOG: HCPCS | Performed by: NURSE ANESTHETIST, CERTIFIED REGISTERED

## 2017-06-02 PROCEDURE — P9041 ALBUMIN (HUMAN),5%, 50ML: HCPCS | Performed by: NURSE ANESTHETIST, CERTIFIED REGISTERED

## 2017-06-02 PROCEDURE — 80048 BASIC METABOLIC PNL TOTAL CA: CPT | Performed by: UROLOGY

## 2017-06-02 PROCEDURE — 25000128 H RX IP 250 OP 636: Performed by: NURSE ANESTHETIST, CERTIFIED REGISTERED

## 2017-06-02 PROCEDURE — 40000170 ZZH STATISTIC PRE-PROCEDURE ASSESSMENT II: Performed by: UROLOGY

## 2017-06-02 PROCEDURE — 88305 TISSUE EXAM BY PATHOLOGIST: CPT | Performed by: UROLOGY

## 2017-06-02 PROCEDURE — 8E0W4CZ ROBOTIC ASSISTED PROCEDURE OF TRUNK REGION, PERCUTANEOUS ENDOSCOPIC APPROACH: ICD-10-PCS | Performed by: UROLOGY

## 2017-06-02 PROCEDURE — 07TC4ZZ RESECTION OF PELVIS LYMPHATIC, PERCUTANEOUS ENDOSCOPIC APPROACH: ICD-10-PCS | Performed by: UROLOGY

## 2017-06-02 PROCEDURE — 36000086 ZZH SURGERY LEVEL 8 1ST 30 MIN UMMC: Performed by: UROLOGY

## 2017-06-02 PROCEDURE — 36000088 ZZH SURGERY LEVEL 8 EA 15 ADDTL MIN - UMMC: Performed by: UROLOGY

## 2017-06-02 PROCEDURE — 36415 COLL VENOUS BLD VENIPUNCTURE: CPT | Performed by: UROLOGY

## 2017-06-02 PROCEDURE — 25000125 ZZHC RX 250: Performed by: ANESTHESIOLOGY

## 2017-06-02 PROCEDURE — 71000014 ZZH RECOVERY PHASE 1 LEVEL 2 FIRST HR: Performed by: UROLOGY

## 2017-06-02 PROCEDURE — 37000009 ZZH ANESTHESIA TECHNICAL FEE, EACH ADDTL 15 MIN: Performed by: UROLOGY

## 2017-06-02 PROCEDURE — 12000003 ZZH R&B CRITICAL UMMC

## 2017-06-02 PROCEDURE — 88307 TISSUE EXAM BY PATHOLOGIST: CPT | Performed by: UROLOGY

## 2017-06-02 PROCEDURE — 88309 TISSUE EXAM BY PATHOLOGIST: CPT | Performed by: UROLOGY

## 2017-06-02 PROCEDURE — 27210794 ZZH OR GENERAL SUPPLY STERILE: Performed by: UROLOGY

## 2017-06-02 PROCEDURE — 25000128 H RX IP 250 OP 636: Performed by: ANESTHESIOLOGY

## 2017-06-02 RX ORDER — HYDROMORPHONE HYDROCHLORIDE 1 MG/ML
.3-.5 INJECTION, SOLUTION INTRAMUSCULAR; INTRAVENOUS; SUBCUTANEOUS
Status: DISCONTINUED | OUTPATIENT
Start: 2017-06-02 | End: 2017-06-03 | Stop reason: HOSPADM

## 2017-06-02 RX ORDER — HEPARIN SODIUM 5000 [USP'U]/.5ML
5000 INJECTION, SOLUTION INTRAVENOUS; SUBCUTANEOUS ONCE
Status: COMPLETED | OUTPATIENT
Start: 2017-06-02 | End: 2017-06-02

## 2017-06-02 RX ORDER — ONDANSETRON 2 MG/ML
4 INJECTION INTRAMUSCULAR; INTRAVENOUS EVERY 30 MIN PRN
Status: DISCONTINUED | OUTPATIENT
Start: 2017-06-02 | End: 2017-06-02 | Stop reason: HOSPADM

## 2017-06-02 RX ORDER — SCOLOPAMINE TRANSDERMAL SYSTEM 1 MG/1
1 PATCH, EXTENDED RELEASE TRANSDERMAL ONCE
Status: DISCONTINUED | OUTPATIENT
Start: 2017-06-02 | End: 2017-06-02 | Stop reason: HOSPADM

## 2017-06-02 RX ORDER — DIPHENHYDRAMINE HCL 12.5MG/5ML
12.5 LIQUID (ML) ORAL EVERY 6 HOURS PRN
Status: DISCONTINUED | OUTPATIENT
Start: 2017-06-02 | End: 2017-06-03 | Stop reason: HOSPADM

## 2017-06-02 RX ORDER — BUPIVACAINE HYDROCHLORIDE 2.5 MG/ML
INJECTION, SOLUTION INFILTRATION; PERINEURAL PRN
Status: DISCONTINUED | OUTPATIENT
Start: 2017-06-02 | End: 2017-06-02 | Stop reason: HOSPADM

## 2017-06-02 RX ORDER — KETOROLAC TROMETHAMINE 15 MG/ML
15 INJECTION, SOLUTION INTRAMUSCULAR; INTRAVENOUS EVERY 6 HOURS
Status: COMPLETED | OUTPATIENT
Start: 2017-06-02 | End: 2017-06-03

## 2017-06-02 RX ORDER — OXYCODONE HYDROCHLORIDE 5 MG/1
5-10 TABLET ORAL EVERY 4 HOURS PRN
Status: DISCONTINUED | OUTPATIENT
Start: 2017-06-02 | End: 2017-06-03 | Stop reason: HOSPADM

## 2017-06-02 RX ORDER — NEOMYCIN/BACITRACIN/POLYMYXINB 3.5-400-5K
OINTMENT (GRAM) TOPICAL
Status: DISCONTINUED | OUTPATIENT
Start: 2017-06-02 | End: 2017-06-03 | Stop reason: HOSPADM

## 2017-06-02 RX ORDER — PROPOFOL 10 MG/ML
INJECTION, EMULSION INTRAVENOUS PRN
Status: DISCONTINUED | OUTPATIENT
Start: 2017-06-02 | End: 2017-06-02

## 2017-06-02 RX ORDER — DEXAMETHASONE SODIUM PHOSPHATE 4 MG/ML
INJECTION, SOLUTION INTRA-ARTICULAR; INTRALESIONAL; INTRAMUSCULAR; INTRAVENOUS; SOFT TISSUE PRN
Status: DISCONTINUED | OUTPATIENT
Start: 2017-06-02 | End: 2017-06-02

## 2017-06-02 RX ORDER — NALOXONE HYDROCHLORIDE 0.4 MG/ML
.1-.4 INJECTION, SOLUTION INTRAMUSCULAR; INTRAVENOUS; SUBCUTANEOUS
Status: DISCONTINUED | OUTPATIENT
Start: 2017-06-02 | End: 2017-06-03 | Stop reason: HOSPADM

## 2017-06-02 RX ORDER — ONDANSETRON 2 MG/ML
INJECTION INTRAMUSCULAR; INTRAVENOUS PRN
Status: DISCONTINUED | OUTPATIENT
Start: 2017-06-02 | End: 2017-06-02

## 2017-06-02 RX ORDER — AMOXICILLIN 250 MG
1-2 CAPSULE ORAL 2 TIMES DAILY
Status: DISCONTINUED | OUTPATIENT
Start: 2017-06-02 | End: 2017-06-03 | Stop reason: HOSPADM

## 2017-06-02 RX ORDER — ALBUMIN, HUMAN INJ 5% 5 %
SOLUTION INTRAVENOUS CONTINUOUS PRN
Status: DISCONTINUED | OUTPATIENT
Start: 2017-06-02 | End: 2017-06-02

## 2017-06-02 RX ORDER — PROCHLORPERAZINE MALEATE 5 MG
5 TABLET ORAL EVERY 6 HOURS PRN
Status: DISCONTINUED | OUTPATIENT
Start: 2017-06-02 | End: 2017-06-03 | Stop reason: HOSPADM

## 2017-06-02 RX ORDER — SODIUM CHLORIDE, SODIUM LACTATE, POTASSIUM CHLORIDE, CALCIUM CHLORIDE 600; 310; 30; 20 MG/100ML; MG/100ML; MG/100ML; MG/100ML
INJECTION, SOLUTION INTRAVENOUS CONTINUOUS PRN
Status: DISCONTINUED | OUTPATIENT
Start: 2017-06-02 | End: 2017-06-02

## 2017-06-02 RX ORDER — FENTANYL CITRATE 50 UG/ML
25-50 INJECTION, SOLUTION INTRAMUSCULAR; INTRAVENOUS
Status: DISCONTINUED | OUTPATIENT
Start: 2017-06-02 | End: 2017-06-02 | Stop reason: HOSPADM

## 2017-06-02 RX ORDER — ALUMINA, MAGNESIA, AND SIMETHICONE 2400; 2400; 240 MG/30ML; MG/30ML; MG/30ML
15-30 SUSPENSION ORAL EVERY 4 HOURS PRN
Status: DISCONTINUED | OUTPATIENT
Start: 2017-06-02 | End: 2017-06-03 | Stop reason: HOSPADM

## 2017-06-02 RX ORDER — ONDANSETRON 2 MG/ML
4 INJECTION INTRAMUSCULAR; INTRAVENOUS EVERY 6 HOURS PRN
Status: DISCONTINUED | OUTPATIENT
Start: 2017-06-02 | End: 2017-06-03 | Stop reason: HOSPADM

## 2017-06-02 RX ORDER — FENTANYL CITRATE 50 UG/ML
INJECTION, SOLUTION INTRAMUSCULAR; INTRAVENOUS PRN
Status: DISCONTINUED | OUTPATIENT
Start: 2017-06-02 | End: 2017-06-02

## 2017-06-02 RX ORDER — CEFAZOLIN SODIUM 2 G/100ML
2 INJECTION, SOLUTION INTRAVENOUS
Status: COMPLETED | OUTPATIENT
Start: 2017-06-02 | End: 2017-06-02

## 2017-06-02 RX ORDER — DIPHENHYDRAMINE HYDROCHLORIDE 50 MG/ML
12.5 INJECTION INTRAMUSCULAR; INTRAVENOUS EVERY 6 HOURS PRN
Status: DISCONTINUED | OUTPATIENT
Start: 2017-06-02 | End: 2017-06-03 | Stop reason: HOSPADM

## 2017-06-02 RX ORDER — SODIUM CHLORIDE, SODIUM LACTATE, POTASSIUM CHLORIDE, CALCIUM CHLORIDE 600; 310; 30; 20 MG/100ML; MG/100ML; MG/100ML; MG/100ML
INJECTION, SOLUTION INTRAVENOUS CONTINUOUS
Status: DISCONTINUED | OUTPATIENT
Start: 2017-06-02 | End: 2017-06-02 | Stop reason: HOSPADM

## 2017-06-02 RX ORDER — SODIUM CHLORIDE 9 MG/ML
INJECTION, SOLUTION INTRAVENOUS CONTINUOUS
Status: DISCONTINUED | OUTPATIENT
Start: 2017-06-02 | End: 2017-06-03 | Stop reason: HOSPADM

## 2017-06-02 RX ORDER — LIDOCAINE 40 MG/G
CREAM TOPICAL
Status: DISCONTINUED | OUTPATIENT
Start: 2017-06-02 | End: 2017-06-03 | Stop reason: HOSPADM

## 2017-06-02 RX ORDER — LIDOCAINE HYDROCHLORIDE 20 MG/ML
INJECTION, SOLUTION INFILTRATION; PERINEURAL PRN
Status: DISCONTINUED | OUTPATIENT
Start: 2017-06-02 | End: 2017-06-02

## 2017-06-02 RX ORDER — ACETAMINOPHEN 325 MG/1
650 TABLET ORAL EVERY 4 HOURS PRN
Status: DISCONTINUED | OUTPATIENT
Start: 2017-06-05 | End: 2017-06-03 | Stop reason: HOSPADM

## 2017-06-02 RX ORDER — ONDANSETRON 4 MG/1
4 TABLET, ORALLY DISINTEGRATING ORAL EVERY 30 MIN PRN
Status: DISCONTINUED | OUTPATIENT
Start: 2017-06-02 | End: 2017-06-02 | Stop reason: HOSPADM

## 2017-06-02 RX ORDER — CEFAZOLIN SODIUM 1 G/3ML
1 INJECTION, POWDER, FOR SOLUTION INTRAMUSCULAR; INTRAVENOUS SEE ADMIN INSTRUCTIONS
Status: DISCONTINUED | OUTPATIENT
Start: 2017-06-02 | End: 2017-06-02 | Stop reason: HOSPADM

## 2017-06-02 RX ORDER — ACETAMINOPHEN 325 MG/1
975 TABLET ORAL EVERY 8 HOURS
Status: DISCONTINUED | OUTPATIENT
Start: 2017-06-02 | End: 2017-06-03 | Stop reason: HOSPADM

## 2017-06-02 RX ORDER — ONDANSETRON 4 MG/1
4 TABLET, ORALLY DISINTEGRATING ORAL EVERY 6 HOURS PRN
Status: DISCONTINUED | OUTPATIENT
Start: 2017-06-02 | End: 2017-06-03 | Stop reason: HOSPADM

## 2017-06-02 RX ORDER — NEOMYCIN/BACITRACIN/POLYMYXINB 3.5-400-5K
OINTMENT (GRAM) TOPICAL 2 TIMES DAILY
Status: DISCONTINUED | OUTPATIENT
Start: 2017-06-02 | End: 2017-06-03 | Stop reason: HOSPADM

## 2017-06-02 RX ORDER — EPHEDRINE SULFATE 50 MG/ML
INJECTION, SOLUTION INTRAMUSCULAR; INTRAVENOUS; SUBCUTANEOUS PRN
Status: DISCONTINUED | OUTPATIENT
Start: 2017-06-02 | End: 2017-06-02

## 2017-06-02 RX ORDER — SIMVASTATIN 20 MG
20 TABLET ORAL AT BEDTIME
Status: DISCONTINUED | OUTPATIENT
Start: 2017-06-02 | End: 2017-06-03 | Stop reason: HOSPADM

## 2017-06-02 RX ADMIN — ROCURONIUM BROMIDE 20 MG: 10 INJECTION INTRAVENOUS at 11:44

## 2017-06-02 RX ADMIN — PHENYLEPHRINE HYDROCHLORIDE 100 MCG: 10 INJECTION, SOLUTION INTRAMUSCULAR; INTRAVENOUS; SUBCUTANEOUS at 12:15

## 2017-06-02 RX ADMIN — PHENYLEPHRINE HYDROCHLORIDE 100 MCG: 10 INJECTION, SOLUTION INTRAMUSCULAR; INTRAVENOUS; SUBCUTANEOUS at 12:11

## 2017-06-02 RX ADMIN — CEFAZOLIN SODIUM 1 G: 2 INJECTION, SOLUTION INTRAVENOUS at 10:23

## 2017-06-02 RX ADMIN — MIDAZOLAM HYDROCHLORIDE 2 MG: 1 INJECTION, SOLUTION INTRAMUSCULAR; INTRAVENOUS at 07:54

## 2017-06-02 RX ADMIN — SODIUM CHLORIDE, POTASSIUM CHLORIDE, SODIUM LACTATE AND CALCIUM CHLORIDE: 600; 310; 30; 20 INJECTION, SOLUTION INTRAVENOUS at 12:54

## 2017-06-02 RX ADMIN — ROCURONIUM BROMIDE 10 MG: 10 INJECTION INTRAVENOUS at 12:53

## 2017-06-02 RX ADMIN — PHENYLEPHRINE HYDROCHLORIDE 100 MCG: 10 INJECTION, SOLUTION INTRAMUSCULAR; INTRAVENOUS; SUBCUTANEOUS at 12:42

## 2017-06-02 RX ADMIN — Medication 5 MG: at 08:53

## 2017-06-02 RX ADMIN — FENTANYL CITRATE 100 MCG: 50 INJECTION, SOLUTION INTRAMUSCULAR; INTRAVENOUS at 08:13

## 2017-06-02 RX ADMIN — FENTANYL CITRATE 50 MCG: 50 INJECTION INTRAMUSCULAR; INTRAVENOUS at 14:25

## 2017-06-02 RX ADMIN — PHENYLEPHRINE HYDROCHLORIDE 100 MCG: 10 INJECTION, SOLUTION INTRAMUSCULAR; INTRAVENOUS; SUBCUTANEOUS at 12:52

## 2017-06-02 RX ADMIN — Medication 1 LOZENGE: at 16:26

## 2017-06-02 RX ADMIN — PHENYLEPHRINE HYDROCHLORIDE 100 MCG: 10 INJECTION, SOLUTION INTRAMUSCULAR; INTRAVENOUS; SUBCUTANEOUS at 11:50

## 2017-06-02 RX ADMIN — PHENYLEPHRINE HYDROCHLORIDE 100 MCG: 10 INJECTION, SOLUTION INTRAMUSCULAR; INTRAVENOUS; SUBCUTANEOUS at 12:20

## 2017-06-02 RX ADMIN — ONDANSETRON 4 MG: 2 INJECTION INTRAMUSCULAR; INTRAVENOUS at 08:29

## 2017-06-02 RX ADMIN — ALBUMIN (HUMAN): 12.5 SOLUTION INTRAVENOUS at 10:19

## 2017-06-02 RX ADMIN — ROCURONIUM BROMIDE 20 MG: 10 INJECTION INTRAVENOUS at 08:54

## 2017-06-02 RX ADMIN — PHENYLEPHRINE HYDROCHLORIDE 100 MCG: 10 INJECTION, SOLUTION INTRAMUSCULAR; INTRAVENOUS; SUBCUTANEOUS at 08:43

## 2017-06-02 RX ADMIN — ALBUMIN (HUMAN): 12.5 SOLUTION INTRAVENOUS at 08:40

## 2017-06-02 RX ADMIN — KETOROLAC TROMETHAMINE 15 MG: 15 INJECTION, SOLUTION INTRAMUSCULAR; INTRAVENOUS at 21:50

## 2017-06-02 RX ADMIN — HYDROMORPHONE HYDROCHLORIDE 0.3 MG: 1 INJECTION, SOLUTION INTRAMUSCULAR; INTRAVENOUS; SUBCUTANEOUS at 14:37

## 2017-06-02 RX ADMIN — FENTANYL CITRATE 50 MCG: 50 INJECTION INTRAMUSCULAR; INTRAVENOUS at 14:14

## 2017-06-02 RX ADMIN — PHENYLEPHRINE HYDROCHLORIDE 100 MCG: 10 INJECTION, SOLUTION INTRAMUSCULAR; INTRAVENOUS; SUBCUTANEOUS at 12:02

## 2017-06-02 RX ADMIN — PHENYLEPHRINE HYDROCHLORIDE 100 MCG: 10 INJECTION, SOLUTION INTRAMUSCULAR; INTRAVENOUS; SUBCUTANEOUS at 12:45

## 2017-06-02 RX ADMIN — PHENYLEPHRINE HYDROCHLORIDE 100 MCG: 10 INJECTION, SOLUTION INTRAMUSCULAR; INTRAVENOUS; SUBCUTANEOUS at 12:08

## 2017-06-02 RX ADMIN — SUGAMMADEX 200 MG: 100 INJECTION, SOLUTION INTRAVENOUS at 13:54

## 2017-06-02 RX ADMIN — BACITRACIN, NEOMYCIN, POLYMYXIN B: 400; 3.5; 5 OINTMENT TOPICAL at 19:56

## 2017-06-02 RX ADMIN — PHENYLEPHRINE HYDROCHLORIDE 100 MCG: 10 INJECTION, SOLUTION INTRAMUSCULAR; INTRAVENOUS; SUBCUTANEOUS at 13:12

## 2017-06-02 RX ADMIN — SENNOSIDES AND DOCUSATE SODIUM 2 TABLET: 8.6; 5 TABLET ORAL at 19:55

## 2017-06-02 RX ADMIN — DEXAMETHASONE SODIUM PHOSPHATE 4 MG: 4 INJECTION, SOLUTION INTRA-ARTICULAR; INTRALESIONAL; INTRAMUSCULAR; INTRAVENOUS; SOFT TISSUE at 08:29

## 2017-06-02 RX ADMIN — Medication 5 MG: at 11:45

## 2017-06-02 RX ADMIN — PHENYLEPHRINE HYDROCHLORIDE 100 MCG: 10 INJECTION, SOLUTION INTRAMUSCULAR; INTRAVENOUS; SUBCUTANEOUS at 12:54

## 2017-06-02 RX ADMIN — PHENYLEPHRINE HYDROCHLORIDE 100 MCG: 10 INJECTION, SOLUTION INTRAMUSCULAR; INTRAVENOUS; SUBCUTANEOUS at 11:55

## 2017-06-02 RX ADMIN — FENTANYL CITRATE 50 MCG: 50 INJECTION, SOLUTION INTRAMUSCULAR; INTRAVENOUS at 10:49

## 2017-06-02 RX ADMIN — SIMVASTATIN 20 MG: 20 TABLET, FILM COATED ORAL at 21:50

## 2017-06-02 RX ADMIN — HEPARIN SODIUM 5000 UNITS: 5000 INJECTION, SOLUTION INTRAVENOUS; SUBCUTANEOUS at 06:43

## 2017-06-02 RX ADMIN — PHENYLEPHRINE HYDROCHLORIDE 100 MCG: 10 INJECTION, SOLUTION INTRAMUSCULAR; INTRAVENOUS; SUBCUTANEOUS at 13:08

## 2017-06-02 RX ADMIN — ROCURONIUM BROMIDE 20 MG: 10 INJECTION INTRAVENOUS at 10:05

## 2017-06-02 RX ADMIN — PHENYLEPHRINE HYDROCHLORIDE 100 MCG: 10 INJECTION, SOLUTION INTRAMUSCULAR; INTRAVENOUS; SUBCUTANEOUS at 12:25

## 2017-06-02 RX ADMIN — ROCURONIUM BROMIDE 10 MG: 10 INJECTION INTRAVENOUS at 09:18

## 2017-06-02 RX ADMIN — CEFAZOLIN SODIUM 2 G: 2 INJECTION, SOLUTION INTRAVENOUS at 08:25

## 2017-06-02 RX ADMIN — KETOROLAC TROMETHAMINE 15 MG: 15 INJECTION, SOLUTION INTRAMUSCULAR; INTRAVENOUS at 16:26

## 2017-06-02 RX ADMIN — PROPOFOL 180 MG: 10 INJECTION, EMULSION INTRAVENOUS at 08:13

## 2017-06-02 RX ADMIN — OXYCODONE HYDROCHLORIDE 10 MG: 5 TABLET ORAL at 21:53

## 2017-06-02 RX ADMIN — ROCURONIUM BROMIDE 50 MG: 10 INJECTION INTRAVENOUS at 08:13

## 2017-06-02 RX ADMIN — FENTANYL CITRATE 50 MCG: 50 INJECTION, SOLUTION INTRAMUSCULAR; INTRAVENOUS at 10:02

## 2017-06-02 RX ADMIN — Medication 5 MG: at 12:45

## 2017-06-02 RX ADMIN — SODIUM CHLORIDE: 9 INJECTION, SOLUTION INTRAVENOUS at 14:38

## 2017-06-02 RX ADMIN — CEFAZOLIN SODIUM 1 G: 2 INJECTION, SOLUTION INTRAVENOUS at 12:12

## 2017-06-02 RX ADMIN — PHENYLEPHRINE HYDROCHLORIDE 100 MCG: 10 INJECTION, SOLUTION INTRAMUSCULAR; INTRAVENOUS; SUBCUTANEOUS at 12:30

## 2017-06-02 RX ADMIN — SODIUM CHLORIDE, POTASSIUM CHLORIDE, SODIUM LACTATE AND CALCIUM CHLORIDE: 600; 310; 30; 20 INJECTION, SOLUTION INTRAVENOUS at 07:59

## 2017-06-02 RX ADMIN — FENTANYL CITRATE 50 MCG: 50 INJECTION, SOLUTION INTRAMUSCULAR; INTRAVENOUS at 08:35

## 2017-06-02 RX ADMIN — PHENYLEPHRINE HYDROCHLORIDE 100 MCG: 10 INJECTION, SOLUTION INTRAMUSCULAR; INTRAVENOUS; SUBCUTANEOUS at 12:49

## 2017-06-02 RX ADMIN — LIDOCAINE HYDROCHLORIDE 80 MG: 20 INJECTION, SOLUTION INFILTRATION; PERINEURAL at 08:13

## 2017-06-02 RX ADMIN — Medication 5 MG: at 12:07

## 2017-06-02 RX ADMIN — PHENYLEPHRINE HYDROCHLORIDE 100 MCG: 10 INJECTION, SOLUTION INTRAMUSCULAR; INTRAVENOUS; SUBCUTANEOUS at 12:34

## 2017-06-02 RX ADMIN — PHENYLEPHRINE HYDROCHLORIDE 100 MCG: 10 INJECTION, SOLUTION INTRAMUSCULAR; INTRAVENOUS; SUBCUTANEOUS at 08:54

## 2017-06-02 RX ADMIN — PHENYLEPHRINE HYDROCHLORIDE 100 MCG: 10 INJECTION, SOLUTION INTRAMUSCULAR; INTRAVENOUS; SUBCUTANEOUS at 12:38

## 2017-06-02 RX ADMIN — ROCURONIUM BROMIDE 20 MG: 10 INJECTION INTRAVENOUS at 11:05

## 2017-06-02 ASSESSMENT — PAIN DESCRIPTION - DESCRIPTORS: DESCRIPTORS: CRAMPING

## 2017-06-02 NOTE — ANESTHESIA POSTPROCEDURE EVALUATION
Patient: Tunde Albarado    Procedure(s):  DaVinci Assisted Radical Prostatectomy, Bilateral Pelvic Lymphadenectomy - Wound Class: II-Clean Contaminated    Diagnosis:Malignant Neoplasm of Prostate  Diagnosis Additional Information: No value filed.    Anesthesia Type:  General, ETT    Note:  Anesthesia Post Evaluation    Patient location during evaluation: PACU  Patient participation: Able to fully participate in evaluation  Level of consciousness: awake and alert  Pain management: adequate  Airway patency: patent  Cardiovascular status: acceptable  Respiratory status: acceptable  Hydration status: acceptable  PONV: none     Anesthetic complications: None          Last vitals:  Vitals:    06/02/17 0613 06/02/17 1410   BP: 168/86 131/76   Resp: 15 16   Temp: 37.1  C (98.7  F) 36.4  C (97.6  F)   SpO2: 97% 100%         Electronically Signed By: Heather Vee MD  June 2, 2017  2:44 PM

## 2017-06-02 NOTE — ANESTHESIA CARE TRANSFER NOTE
Patient: Tunde Albarado    Procedure(s):  DaVinci Assisted Radical Prostatectomy, Bilateral Pelvic Lymphadenectomy - Wound Class: II-Clean Contaminated    Diagnosis: Malignant Neoplasm of Prostate  Diagnosis Additional Information: No value filed.    Anesthesia Type:   General, ETT     Note:  Airway :Face Mask  Patient transferred to:PACU  Comments: To PACU, VSS, airway patent, RN at bedside, patient alert.      Vitals: (Last set prior to Anesthesia Care Transfer)    CRNA VITALS  6/2/2017 1341 - 6/2/2017 1411      6/2/2017             Resp Rate (set): 10                Electronically Signed By: NIGEL Louie CRNA  June 2, 2017  2:11 PM

## 2017-06-02 NOTE — PLAN OF CARE
Problem: Goal Outcome Summary  Goal: Goal Outcome Summary  Outcome: No Change  VSS, afebrile. Incisions intact. PIV infusing. Tolerating sips of clear liquids. Toradol given for pain. Declined narcotics. C/O sore throat. Lozenge given. Capnography on. All values WNL. Pt resting. Will continue with POC.

## 2017-06-02 NOTE — OP NOTE
PREPROCEDURE DIAGNOSIS: Prostate cancer.   POSTPROCEDURE DIAGNOSIS: Prostate cancer.   PROCEDURES PERFORMED: Robotic radical prostatectomy, bilateral pelvic lymph node dissection.   SURGEON: Fanny Peace MD   ASSISTANT: Efraín Lo MD   SECOND ASSISTANT: Nestor Galeas MD   ANESTHESIA: General with endotracheal intubation.   INDICATIONS FOR PROCEDURE: Mr. Fuentes is a 67year-old gentleman with a history of  [place] prostate cancer. He has been counseled regarding treatment options and presents to undergo surgery.   Findings:  Negative left apex margin.  No rectal injury.  Bilateral nerve sparing  Specimens:  Prostate, seminal vesicles, lymph nodes  DESCRIPTION OF PROCEDURE: After fully informed voluntary consent was obtained, the patient was brought into the operating room, properly identified and placed in a dorsal lithotomy position on the table. General anesthesia was induced, endotracheal intubation performed, IV Ancef administered. The patient was then positioned appropriately on the table and all pressure points were padded and he was secured to the table with tape. Once the positioning was performed, we proceeded with shaving, prepping and draping the abdomen in the usual sterile fashion with Betadine. Hi was placed in the bladder in a sterile manner on the field. A midline incision about 18 cm above the pubic symphysis was made and a Veress needle introduced into the abdomen through this incision. Once the abdomen was accessed, it was confirmed using a saline drop test and the abdomen was insufflated. Once the abdomen was completely insufflated, additional robotic ports, 2 on the left and 1 on the right, were placed and a right-sided assistant port between the right-sided robot port and main camera were placed. Robot was then docked and surgery was commenced. The sigmoid was adherent to the lateral pelvic sidewall and these adhesions were taken down sharply. We then made a transverse incision  posterior to the seminal vesicles and mobilized the rectum away from the posterior aspect of the prostate. The vas deferens on either side were identified and divided with electrocautery and the seminal vesicles were carefully dissected out with the vasculature being controlled using Lapro clips. Once seminal vesicle and vas deferens were dissected all the way down to the prostate, attention was turned to bringing the bladder down off the anterior wall of the abdomen. Incisions were made parallel to the urachus on either side and carried all the way up to the umbilicus.  The urachus was left attached to the anterior abdominal wall. The rest of the bladder was mobilized away from the pelvic sidewalls on both sides. We then performed a pelvic lymph node dissection removing all krzysztof tissue between the external iliac vein, the pelvic floor, inguinal ligament up to the bifurcation of the common iliac vessels on both sides.  We then transversely incised the anterior bladder neck at the base of the prostate until we were able to incise the anterior urethra and identify the Hi catheter. The Hi was then pulled out through this opening in the urethra to give upward traction on the prostate. Posterior bladder neck was then divided.  The prostatic pedicles were then divided using Lapro clips and the neurovascular bundles were then  off posterolaterally on both sides all the way to the apex and also  posteriorly off the prostate. We then divided the urethra distal to the prostatic apex after carefully dissecting out the apex and taking care to ensure there was no capsular incision. Once the prostate was completely resected, it was set aside in the left pericolic gutter. We then carefully inspected for hemostasis.  The dorsal vein complex was oversewn using a 3.0 vlock suture.  . We closed some of the perirectal fat in the midline after ensuring there was no rectal injury.  We also used a 3.0 Vlock suture  and reapproximated the muscle of the posterior trigone as it was weak.  We then completed our vesicourethral anastomosis in a standard Van Velthoven fashion using two 3-0 Monocryl sutures, the tail ends of which had been tied together. The first suture was taken at the 5 o'clock position through the bladder neck and taken through the posterior urethral plate and up the left side of the urethra and bladder to the 11 o'clock position. We then brought the right sided anastomotic suture through the urethra and up the right side of the urethra and bladder to the midline. The sutures were then tied down. A final Hi catheter was then placed and the bladder irrigated.  We tested the bladder by instilling 120cc of saline and found it to be leak free.   An EndoCatch bag was used to retrieve the specimen and a 19 Benjamin drain was placed in the pelvis. We then undocked the robot and removed all the ports. Midline camera port incision was enlarged and the specimen retrieved through this incision. The fascia was reapproximated using interrupted figure-of-eight 0 Vicryl sutures on UR-6 needle. The skin of all the incisions was closed using running subcuticular closure with 3-0 Monocryl. The patient tolerated the procedure well. There were no complications. Blood loss was 400 mL. All sponge, needle and instrument counts were correct and doubly verified prior to closure. I was present and involved in the entire procedure.   DEANA RUEDA MD

## 2017-06-02 NOTE — IP AVS SNAPSHOT
MRN:[id number]                      After Visit Summary   6/2/2017    Tunde Albarado    MRN: [id number]           Thank you!     Thank you for choosing [facility] for your care. Our goal is always to provide you with excellent care. Hearing back from our patients is one way we can continue to improve our services. Please take a few minutes to complete the written survey that you may receive in the mail after you visit with us. Thank you!        Patient Information     Date Of Birth          1950        About your hospital stay     You were admitted on:  June 2, 2017 You last received care in the:  [address] [facility]    You were discharged on:  Lisa 3, 2017        Reason for your hospital stay       Postoperative care following RALP                  Who to Call     For medical emergencies, please call 911.  For non-urgent questions about your medical care, please call your primary care provider or clinic, [phone]  For questions related to your surgery, please call your surgery clinic        Attending Provider     Provider Specialty    Fanny Peace MD Urology       Primary Care Provider Office Phone # Fax #    Mayur SPANN Hinds [phone]       When to contact your care team       See discharge instructions                  After Care Instructions     Activity       See discharge instructions            Diet       See discharge instructions            Discharge Instructions       Activity  - No strenuous exercise for 6 weeks.  - No lifting, pushing, pulling more than 10 pounds for 6 weeks.   - Do not strain with bowel movements.  - Do not drive until you can press the brake pedal quickly and fully without pain.   - Do not operate a motor vehicle while taking narcotic pain medications.     Urination   - You are going home with the following tubes or drains: muhammad catheter.  Nurse/ to write care and instructions.  Treat the catheter like an extension of your  "body; do not let it get caught or snagged on anything.  Leave the catheter in place until your follow up. Call the numbers listed above for any concerns.   - Catheter to be removed in clinic.  Cathter should not be manipulated or removed by anyone aside from urology  - Some light redness (up to a watermelon-like-pink in color) is expected in the upcoming 7-10 days.   - If having hematuria (blood in the urine), make sure to increase your water intake and monitor for improvement  - If you are unable to urinate you should return urgently to the ED or call clinic to try to arrange for an urgent visit same day.    Medications  - Transition from narcotic pain medications to tylenol (acetaminophen) as you are able.  Wean yourself off all pain medications as you are able.  - Some pain medications contain both tylenol (acetaminophen) and a narcotic (Norco, vicodin, percocet), do not take more than 4,000mg of Tylenol (acetaminophen) from all sources in any 24 hour period.  - Narcotics can make you constipated.  Take over the counter fiber (metamucil or benefiber) and stool softeners (miralax, docusate or senna) while taking narcotic pain medications, but stop if you develop diarrhea.  - No driving or operating machinery while taking narcotic pain medications     Follow-Up:  - Call your primary care provider to touch base regarding your recent admission.    - Call or return sooner than your regularly scheduled visit if you develop any of the following: fever (greater than 101.5), uncontrolled pain, uncontrolled nausea or vomiting, as well as increased redness, swelling, or drainage from your wound.     Phone numbers:   - Monday through Friday 8am to 4:30pm: Call [phone] with questions or to schedule or confirm appointment.    - Nights or weekends: call the after hours emergency pager - [phone] and tell the  \"I would like to page the Urology Resident on call.\"  - For emergencies, call [phone]            IV access  "      See discharge instructions            Monitor and record       See discharge instructions            Supplies       Hi leg bag            Tubes and drains       See discharge instructions            Wound care and dressings       See discharge instructions                  Follow-up Appointments     Follow Up and recommended labs and tests       Follow up as scheduled for catheter removal in clinic                  Your next 10 appointments already scheduled     Jun 14, 2017  2:30 PM CDT   XR CYSTOGRAM with UCXR2, UC JUANU RAD   [facility] ([facility])    [address]  1st Floor  [facility] [id number] [phone]           Please bring a list of your current medicines to your exam. (Include vitamins, minerals and over-thecounter medicines.) Leave your valuables at home.  Tell your doctor if there is a chance you may be pregnant.  You do not need to do anything special for this exam.            Jun 14, 2017  3:30 PM CDT   (Arrive by 3:15 PM)   Post-Op with Bessy Echeverria MD   [facility] and [facility]rologic C[facility] ([facility])    [address]  4th Floor  [facility] [id number] [phone]              Pending Results     Date and Time Order Name Status Description    6/2/2017 1041 Surgical pathology exam Preliminary             Statement of Approval     Ordered          06/03/17 1227  I have reviewed and agree with all the recommendations and orders detailed in this document.  EFFECTIVE NOW     Approved and electronically signed by:  Brendan Ag MD             Admission Information     Date & Time Provider Department Dept. Phone    6/2/2017 Fanny Peace MD [address] [facility] [phone]      Your Vitals Were     Blood Pressure Temperature Respirations Height Weight Pulse Oximetry    122/65 (BP Location: Right arm) 97.7  F (36.5  C) (Oral) 14 1.829 m (6') 101.3 kg (223  "lb 5.2 oz) 97%    BMI (Body Mass Index)                   30.29 kg/m2           Group Therapy Records Information     [facility] Records lets you send messages to your doctor, view your test results, renew your prescriptions, schedule appointments and more. To sign up, go to [email or web address] . Click on \"Log in\" on the left side of the screen, which will take you to the Welcome page. Then click on \"Sign up Now\" on the right side of the page.     You will be asked to enter the access code listed below, as well as some personal information. Please follow the directions to create your username and password.     Your access code is: [email or web address]  Expires: 2017  2:32 PM     Your access code will  in 90 days. If you need help or a new code, please call your [place] clinic or [phone].        Care EveryWhere ID     This is your Care EveryWhere ID. This could be used by other organizations to access your [place] medical records  [id number]           Review of your medicines      START taking        Dose / Directions    acetaminophen 325 MG tablet   Commonly known as:  TYLENOL   Used for:  Malignant neoplasm of prostate (H)        Dose:  650 mg   Take 2 tablets (650 mg) by mouth every 4 hours as needed for mild pain   Quantity:  100 tablet   Refills:  0       docusate sodium 100 MG tablet   Commonly known as:  COLACE   Used for:  Malignant neoplasm of prostate (H)        Dose:  100 mg   Take 100 mg by mouth daily   Quantity:  40 tablet   Refills:  1       oxybutynin 5 MG 24 hr tablet   Commonly known as:  DITROPAN XL   Used for:  Bladder spasms        Dose:  5 mg   Take 1 tablet (5 mg) by mouth daily as needed for bladder spasms   Quantity:  15 tablet   Refills:  3       oxyCODONE 5 MG IR tablet   Commonly known as:  ROXICODONE   Used for:  Malignant neoplasm of prostate (H)        Dose:  5 mg   Take 1 tablet (5 mg) by mouth every 4 hours as needed for pain maximum 6 tablet(s) per day   Quantity:  30 tablet   Refills:  0    "      CONTINUE these medicines which have NOT CHANGED        Dose / Directions    ASPIRIN PO        Dose:  81 mg   Take 81 mg by mouth every morning   Refills:  0       lisinopril-hydrochlorothiazide 10-12.5 MG per tablet   Commonly known as:  PRINZIDE/ZESTORETIC        Dose:  1 tablet   Take 1 tablet by mouth every morning   Refills:  0       SIMVASTATIN PO        Dose:  20 mg   Take 20 mg by mouth At Bedtime   Refills:  0            Where to get your medicines      These medications were sent to [facility] - [place], [place] - [address]  [address] [address]     Phone:  [phone]     docusate sodium 100 MG tablet    oxybutynin 5 MG 24 hr tablet         Some of these will need a paper prescription and others can be bought over the counter. Ask your nurse if you have questions.     Bring a paper prescription for each of these medications     oxyCODONE 5 MG IR tablet       You don't need a prescription for these medications     acetaminophen 325 MG tablet                Protect others around you: Learn how to safely use, store and throw away your medicines at www.disposemymeds.org.             Medication List: This is a list of all your medications and when to take them. Check marks below indicate your daily home schedule. Keep this list as a reference.      Medications           Morning Afternoon Evening Bedtime As Needed    acetaminophen 325 MG tablet   Commonly known as:  TYLENOL   Take 2 tablets (650 mg) by mouth every 4 hours as needed for mild pain   Last time this was given:  975 mg on 6/3/2017  8:21 AM                                ASPIRIN PO   Take 81 mg by mouth every morning                                docusate sodium 100 MG tablet   Commonly known as:  COLACE   Take 100 mg by mouth daily                                lisinopril-hydrochlorothiazide 10-12.5 MG per tablet   Commonly known as:  PRINZIDE/ZESTORETIC   Take 1 tablet by mouth every morning                                 oxybutynin 5 MG 24 hr tablet   Commonly known as:  DITROPAN XL   Take 1 tablet (5 mg) by mouth daily as needed for bladder spasms                                oxyCODONE 5 MG IR tablet   Commonly known as:  ROXICODONE   Take 1 tablet (5 mg) by mouth every 4 hours as needed for pain maximum 6 tablet(s) per day   Last time this was given:  10 mg on 6/2/2017  9:53 PM                                SIMVASTATIN PO   Take 20 mg by mouth At Bedtime   Last time this was given:  20 mg on 6/2/2017  9:50 PM

## 2017-06-03 VITALS
OXYGEN SATURATION: 97 % | HEIGHT: 72 IN | RESPIRATION RATE: 14 BRPM | DIASTOLIC BLOOD PRESSURE: 65 MMHG | BODY MASS INDEX: 30.25 KG/M2 | SYSTOLIC BLOOD PRESSURE: 122 MMHG | TEMPERATURE: 97.7 F | WEIGHT: 223.33 LBS

## 2017-06-03 LAB
ANION GAP SERPL CALCULATED.3IONS-SCNC: 7 MMOL/L (ref 3–14)
BUN SERPL-MCNC: 21 MG/DL (ref 7–30)
CALCIUM SERPL-MCNC: 8.2 MG/DL (ref 8.5–10.1)
CHLORIDE SERPL-SCNC: 108 MMOL/L (ref 94–109)
CO2 SERPL-SCNC: 25 MMOL/L (ref 20–32)
CREAT FLD-MCNC: 1.4 MG/DL
CREAT SERPL-MCNC: 1.25 MG/DL (ref 0.66–1.25)
ERYTHROCYTE [DISTWIDTH] IN BLOOD BY AUTOMATED COUNT: 13.8 % (ref 10–15)
GFR SERPL CREATININE-BSD FRML MDRD: 57 ML/MIN/1.7M2
GLUCOSE SERPL-MCNC: 122 MG/DL (ref 70–99)
HCT VFR BLD AUTO: 33.3 % (ref 40–53)
HGB BLD-MCNC: 10.7 G/DL (ref 13.3–17.7)
MCH RBC QN AUTO: 29.6 PG (ref 26.5–33)
MCHC RBC AUTO-ENTMCNC: 32.1 G/DL (ref 31.5–36.5)
MCV RBC AUTO: 92 FL (ref 78–100)
PLATELET # BLD AUTO: 147 10E9/L (ref 150–450)
POTASSIUM SERPL-SCNC: 4.4 MMOL/L (ref 3.4–5.3)
RBC # BLD AUTO: 3.62 10E12/L (ref 4.4–5.9)
SODIUM SERPL-SCNC: 140 MMOL/L (ref 133–144)
SPECIMEN SOURCE FLD: NORMAL
WBC # BLD AUTO: 11.1 10E9/L (ref 4–11)

## 2017-06-03 PROCEDURE — A9270 NON-COVERED ITEM OR SERVICE: HCPCS | Mod: GY | Performed by: UROLOGY

## 2017-06-03 PROCEDURE — 25000132 ZZH RX MED GY IP 250 OP 250 PS 637: Mod: GY | Performed by: UROLOGY

## 2017-06-03 PROCEDURE — 80048 BASIC METABOLIC PNL TOTAL CA: CPT | Performed by: UROLOGY

## 2017-06-03 PROCEDURE — 25000128 H RX IP 250 OP 636: Performed by: UROLOGY

## 2017-06-03 PROCEDURE — 36415 COLL VENOUS BLD VENIPUNCTURE: CPT | Performed by: UROLOGY

## 2017-06-03 PROCEDURE — 82570 ASSAY OF URINE CREATININE: CPT | Performed by: UROLOGY

## 2017-06-03 PROCEDURE — 85027 COMPLETE CBC AUTOMATED: CPT | Performed by: UROLOGY

## 2017-06-03 RX ORDER — OXYCODONE HYDROCHLORIDE 5 MG/1
5 TABLET ORAL EVERY 4 HOURS PRN
Qty: 30 TABLET | Refills: 0 | Status: SHIPPED | OUTPATIENT
Start: 2017-06-03 | End: 2017-08-02

## 2017-06-03 RX ORDER — ACETAMINOPHEN 325 MG/1
650 TABLET ORAL EVERY 4 HOURS PRN
Qty: 100 TABLET | Refills: 0 | COMMUNITY
Start: 2017-06-03 | End: 2022-01-07

## 2017-06-03 RX ORDER — OXYBUTYNIN CHLORIDE 5 MG/1
5 TABLET, EXTENDED RELEASE ORAL DAILY PRN
Qty: 15 TABLET | Refills: 3 | Status: SHIPPED | OUTPATIENT
Start: 2017-06-03 | End: 2017-08-02

## 2017-06-03 RX ORDER — ASPIRIN 81 MG
100 TABLET, DELAYED RELEASE (ENTERIC COATED) ORAL DAILY
Qty: 40 TABLET | Refills: 1 | Status: SHIPPED | OUTPATIENT
Start: 2017-06-03 | End: 2017-08-02

## 2017-06-03 RX ADMIN — KETOROLAC TROMETHAMINE 15 MG: 15 INJECTION, SOLUTION INTRAMUSCULAR; INTRAVENOUS at 04:23

## 2017-06-03 RX ADMIN — KETOROLAC TROMETHAMINE 15 MG: 15 INJECTION, SOLUTION INTRAMUSCULAR; INTRAVENOUS at 10:41

## 2017-06-03 RX ADMIN — ACETAMINOPHEN 975 MG: 325 TABLET, FILM COATED ORAL at 08:21

## 2017-06-03 RX ADMIN — ACETAMINOPHEN 975 MG: 325 TABLET, FILM COATED ORAL at 01:42

## 2017-06-03 RX ADMIN — SENNOSIDES AND DOCUSATE SODIUM 2 TABLET: 8.6; 5 TABLET ORAL at 08:20

## 2017-06-03 RX ADMIN — BACITRACIN, NEOMYCIN, POLYMYXIN B: 400; 3.5; 5 OINTMENT TOPICAL at 08:21

## 2017-06-03 ASSESSMENT — PAIN DESCRIPTION - DESCRIPTORS: DESCRIPTORS: ACHING

## 2017-06-03 NOTE — PROGRESS NOTES
Urology Daily Progress Note    24 hour events/Subjective:     - No acute events overnight   - OOB with some dizziness   - Tolerating CLD without N/V   - No flatus  O:  Vitals: /65 (BP Location: Right arm)  Temp 97.7  F (36.5  C) (Oral)  Resp 14  Ht 1.829 m (6')  Wt 101.3 kg (223 lb 5.2 oz)  SpO2 97%  BMI 30.29 kg/m2  General: Alert, interactive, in NAD  Resp: Non-labored breathing on RA  Abdomen: Soft, non-distended, appropriately tender abdomen with incisions secured with dermabond  Ext: Warm and well perfused   Hi/Urostomy: Sarah Beth urine   Drain: Serosanguinous    I/O last 3 completed shifts:  In: 5185 [P.O.:500; I.V.:3935]  Out: 1585 [Urine:1055; Drains:130; Blood:400] - Last 24 hours      Labs/Imaging  Heme:    Recent Labs  Lab 06/03/17  0803 06/02/17  2112   WBC 11.1* 12.0*   HGB 10.7* 11.6*   * 179     Chem:    Recent Labs  Lab 06/02/17 2112   POTASSIUM 4.6   CR 1.31*       Assessment/Plan  67 year old y/o male POD#1  s/p RALP.   Postoperative course unremarkable.  Anticipate DC to home today.       NEURO Well controlled on scheduled tylenol and ketorolac; PRNs   CV HDS.  Home antihypertensives held   PULM Aggressive pulmonary toilet and I/S.  Wean supplemental O2   FEN/GI Advance diet; Continue IVF     Hi to remain in place until after discharge; Rise in serum creatinine likely prerenal in nature - will follow up on repeat AM labs and continue IVF; Follow up on BHARATH creatinine.     HEME Follow up on AM labs   ID Afebrile, no leukocytosis.      ENDO No issues   ACTIVITY OOB   PPx Mechanical   DISPO DC to home this afternoon         Seen and examined with chief resident, to be discussed with Dr. Peace    --    Brendan Jones MD  Urology Resident           Contacting the Urology Team     Please use the following job codes to reach the Urology Team. Note that you must use an in house phone and that job codes cannot receive text pages.     On weekdays, dial [phone] (or star-star-star  [phone] on the new [employer] telephones) then [phone] to reach the Adult Urology resident or PA on call    On weekdays, dial [phone] (or star-star-star [phone] on the new [employer] telephones) then [phone] to reach the Pediatric Urology resident    On weeknights and weekends, dial [phone] (or star-star-star [phone] on the new [employer] telephones) then [phone] to reach the Urology resident on call (for both Adult and Pediatrics)

## 2017-06-03 NOTE — PLAN OF CARE
Problem: Individualization  Goal: Patient Preferences  Outcome: No Change  Abdomen appear rounded, hypoactive in all quadrant/passing flatus. Dangled at the edge of the bed, stood up, dizzy and then back to bed. Lap sites all intact with no drainage/derma bonded. Hi is patent with pink tinged urine/adequate output. Oxycodone and ketorolac is effective for pain. BHARATH with small output. PostOp labs ok. Capnography IPI within 9-10. No new acute event at this time. Patient is doing well.  Continue with plan of care.

## 2017-06-03 NOTE — PLAN OF CARE
Problem: Goal Outcome Summary  Goal: Goal Outcome Summary  Outcome: Improving  VSS, afebrile. Pain controlled. Abdomen distended. MD aware. Pt reported passing gas. BHARATH removed. Dressing c/d/i. Urethral muhammad intact. Adequate urine output. Muhammad catheter teaching done. Pt and son verbalized understanding. Discharge orders reviewed with patient. Verbalized understanding. PIV removed catheter intact. Pt ambulated to discharge pharmacy and front door independently. All belongings left with patient.

## 2017-06-05 ENCOUNTER — CARE COORDINATION (OUTPATIENT)
Dept: CARE COORDINATION | Facility: CLINIC | Age: 67
End: 2017-06-05

## 2017-06-05 ENCOUNTER — PRE VISIT (OUTPATIENT)
Dept: UROLOGY | Facility: CLINIC | Age: 67
End: 2017-06-05

## 2017-06-05 NOTE — DISCHARGE SUMMARY
Discharge Summary     Tunde Albarado MRN# [id number]   YOB: 1950 Age: 67 year old     Date of Admission:  6/2/2017  Date of Discharge::  6/3/2017  5:03 PM  Admitting Physician:  Fanny Peace MD  Discharge Physician:  Jericho Galeas MD  Primary Care Physician:         Mayur Hinds          Admission Diagnoses:   Prostate cancer (H) [C61]            Discharge Diagnosis:   Same as above           Procedures:   : Procedure(s):  DaVinci Assisted Radical Prostatectomy, Bilateral Pelvic Lymphadenectomy - Wound Class: II-Clean Contaminated        Non-operative procedures:   None performed          Consultations:   None           Imaging Studies:     Results for orders placed or performed during the hospital encounter of 05/24/17   MRI Prostate    Narrative    MRI PROSTATE:  5/24/2017 7:11 PM    CLINICAL HISTORY: Malignant neoplasm of prostate    Comparison: None.    TECHNIQUE:     The following sequences were obtained: High-resolution axial  T2-weighted, coronal T2-weighted, 3D volumetric T2-weighted, axial  pre-contrast T1, axial diffusion-weighted, axial apparent diffusion  coefficient and axial dynamic contrast-enhanced T1. Postcontrast  images were evaluated on a separate workstation to evaluate dynamic  contrast enhancement.  Contrast dose: 10 mL Gadavist    The images are interpreted according to PI-RADS v.2    FINDINGS:  Prostate gland size: 3.7 x 4.7 x 5.1 cm.  Volume: 46.1 g. There is a  large amount of postbiopsy hemorrhage present in the left and to a  lesser degree right peripheral zones    Peripheral zone: 6 x 7 x 6 mm T2 hypointense area in the mid  peripheral zone at 6:00-7:00 (series 4 image 44). There is low ADC  signal without an area of punctate high B value DWI signal. No  evidence of extracapsular extension.    PI-RADS:  Peripheral zone T2: 4  Diffusion-weighted image: 4    Contrast-enhanced images: Positive      Overall assessment: 4    Poorly  defined low T2 signal from approximately 6:00-9:00 in the mid  and base of the right peripheral zone has slightly low ADC signal and  less hemorrhage than the surrounding tissue. No evidence of  extracapsular extension.    PI-RADS:  T2-weighted: 3  Diffusion-weighted image: 2  Contrast-enhanced images: Negative    Overall assessment: 2    Ill-defined low T2 signal involving the mid left peripheral zone from  2:00-3:00 measuring approximately 11 x 9 x 11 mm (series 5 image 13)  shows only minimally low ADC. This area is also spared of significant  hemorrhage. No evidence of extracapsular extension.    PI-RADS:  T2-weighted: 3  Diffusion-weighted image: 2  Contrast-enhanced images: Negative    Overall assessment: 2    Transitional zone: There are BPH type changes without suspicious  lesion.    Remainder of the pelvis:  No suspicious lymphadenopathy. Sigmoid  diverticulosis. Fat-containing left inguinal hernia.      Impression    IMPRESSION:   1. Based on the most suspicious abnormality, this exam is  characterized as PIRADS 4 - High probability.  Clinically significant  cancer is likely to be present.  The most suspicious abnormality is  located at the 7:00 position in the posterior mid-gland peripheral  zone and there is no evidence of extracapsular extension.  2. No evidence of extraprostatic malignancy.    The images are interpreted according to PI-RADS v2.  http://www.acr.org/~/media/ACR/Documents/PDF/QualitySafety/Resources  PIRADS/[email or web address].pdf    I have personally reviewed the examination and initial interpretation  and I agree with the findings.    TAMMI HARRIS            Medications Prior to Admission:     No prescriptions prior to admission.            Discharge Medications:     Discharge Medication List as of 6/3/2017  3:46 PM      START taking these medications    Details   oxyCODONE (ROXICODONE) 5 MG IR tablet Take 1 tablet (5 mg) by mouth every 4 hours as needed for pain maximum 6 tablet(s) per day,  Disp-30 tablet, R-0, Local Print      docusate sodium (COLACE) 100 MG tablet Take 100 mg by mouth daily, Disp-40 tablet, R-1, E-Prescribe      acetaminophen (TYLENOL) 325 MG tablet Take 2 tablets (650 mg) by mouth every 4 hours as needed for mild pain, Disp-100 tablet, R-0, OTC      oxybutynin (DITROPAN XL) 5 MG 24 hr tablet Take 1 tablet (5 mg) by mouth daily as needed for bladder spasms, Disp-15 tablet, R-3, E-PrescribeDo not take within 24 hours of follow up appointment         CONTINUE these medications which have NOT CHANGED    Details   lisinopril-hydrochlorothiazide (PRINZIDE/ZESTORETIC) 10-12.5 MG per tablet Take 1 tablet by mouth every morning , Historical      ASPIRIN PO Take 81 mg by mouth every morning , Historical      SIMVASTATIN PO Take 20 mg by mouth At Bedtime, Historical                    Brief History of Illness:   Reason for admission requiring a surgical or invasive procedure:   Malignant Neoplasm of Prostate   The patient underwent the following procedure(s):   See above   There were no immediate complications during this procedure.    Please refer to the full operative summary for details.           Hospital Course:   The patient tolerated the procedure well.  His postoperative course was similarly unremarkable.      On POD#1 patient was ambulating without assitance, tolerating the discharge diet, had pain controlled with PO medications to go home with, and requiring no IV medications or fluids. Patient was discharged home with appropriate contact information, follow-up and instructions as seen below in the discharge paperwork.       Final Pathology Result:   Pending at time of discharge         Discharge Instructions and Follow-Up:     Discharge Procedure Orders  Reason for your hospital stay   Order Comments: Postoperative care following RALP     Follow Up and recommended labs and tests   Order Comments: Follow up as scheduled for catheter removal in clinic     Activity   Order Comments:  See discharge instructions   Order Specific Question Answer Comments   Is discharge order? Yes      Monitor and record   Order Comments: See discharge instructions     When to contact your care team   Order Comments: See discharge instructions     Wound care and dressings   Order Comments: See discharge instructions     Tubes and drains   Order Comments: See discharge instructions     IV access   Order Comments: See discharge instructions     Discharge Instructions   Order Comments: Activity  - No strenuous exercise for 6 weeks.  - No lifting, pushing, pulling more than 10 pounds for 6 weeks.   - Do not strain with bowel movements.  - Do not drive until you can press the brake pedal quickly and fully without pain.   - Do not operate a motor vehicle while taking narcotic pain medications.     Urination   - You are going home with the following tubes or drains: muhammad catheter.  Nurse/ to write care and instructions.  Treat the catheter like an extension of your body; do not let it get caught or snagged on anything.  Leave the catheter in place until your follow up. Call the numbers listed above for any concerns.   - Catheter to be removed in clinic.  Cathter should not be manipulated or removed by anyone aside from urology  - Some light redness (up to a watermelon-like-pink in color) is expected in the upcoming 7-10 days.   - If having hematuria (blood in the urine), make sure to increase your water intake and monitor for improvement  - If you are unable to urinate you should return urgently to the ED or call clinic to try to arrange for an urgent visit same day.    Medications  - Transition from narcotic pain medications to tylenol (acetaminophen) as you are able.  Wean yourself off all pain medications as you are able.  - Some pain medications contain both tylenol (acetaminophen) and a narcotic (Norco, vicodin, percocet), do not take more than 4,000mg of Tylenol (acetaminophen) from all sources in any 24  "hour period.  - Narcotics can make you constipated.  Take over the counter fiber (metamucil or benefiber) and stool softeners (miralax, docusate or senna) while taking narcotic pain medications, but stop if you develop diarrhea.  - No driving or operating machinery while taking narcotic pain medications     Follow-Up:  - Call your primary care provider to touch base regarding your recent admission.    - Call or return sooner than your regularly scheduled visit if you develop any of the following: fever (greater than 101.5), uncontrolled pain, uncontrolled nausea or vomiting, as well as increased redness, swelling, or drainage from your wound.     Phone numbers:   - Monday through Friday 8am to 4:30pm: Call [phone] with questions or to schedule or confirm appointment.    - Nights or weekends: call the after hours emergency pager - [phone] and tell the  \"I would like to page the Urology Resident on call.\"  - For emergencies, call [phone]     Supplies   Order Comments: Hi leg bag     Full Code     Diet   Order Comments: See discharge instructions   Order Specific Question Answer Comments   Is discharge order? Yes               Discharge Disposition:   Discharged to Home      Condition at discharge: Good    --    JULI Jones MD  Urology Resident    12:41 AM, 6/5/2017  "

## 2017-06-05 NOTE — PROGRESS NOTES
"[facility]  \"Hello, my name is Bhumika Cheatham , and I am calling from the [facility].  I want to check in and see how you are doing, after leaving the hospital.  You may also receive a call from your Care Coordinator (care team), but I want to make sure you don t have any urgent needs.  I have a couple questions to review with you:     Post-Discharge Outreach                                                    Tunde Albarado is a 67 year old male     Follow-up Appointments           Follow Up and recommended labs and tests         Follow up as scheduled for catheter removal in clinic                        Your next 10 appointments already scheduled            Jun 14, 2017  2:30 PM CDT   XR CYSTOGRAM with UCXR2, UC GIGU RAD   [facility] ([facility])     [address]  1st [address]  [facility] [address] [phone]                 Please bring a list of your current medicines to your exam. (Include vitamins, minerals and over-thecounter medicines.) Leave your valuables at home.  Tell your doctor if there is a chance you may be pregnant.  You do not need to do anything special for this exam.                  Jun 14, 2017  3:30 PM CDT   (Arrive by 3:15 PM)   Post-Op with Bessy Echeverria MD   [facility]rolog[facility] ([facility])     [address]  [address] [facility] [address] [phone]                      Care Team:    Patient Care Team       Relationship Specialty Notifications Start End    Mayur Hinds PCP - General Family Practice  5/8/17     Phone: [phone] Fax: [phone]         [facility] [address] [facility] [address]    Fanny Peace MD MD Urology  5/8/17     Phone: [phone] Fax: [phone]          [facility] [address] [facility] [address] [facility] [address]    Bessy Echeverria MD MD Urology  5/24/17     " Phone: [phone] Fax: [phone]         [facility] [address] [facility] [address]    Bruna Mcgregor, RN Registered Nurse Urology  5/24/17     Phone: [phone] Pager: [phone]        Mayur Hinds Referring Physician Family Practice  5/24/17     Phone: [phone] Fax: [phone]         [facility] [address] [place] [id number]            Transition of Care Review                                                      Patient was called four times and no answer so post 24 hr DC follow up calls will be closed out, message was left with contact number for department seen by or following up with                    Plan                                                      Thanks for your time.  Your Care Coordinator may follow-up within the next couple days.  In the meantime if you have questions, concerns or problems call your care team.        Bhumika Cheatham

## 2017-06-05 NOTE — TELEPHONE ENCOUNTER
Post op.  S/P DaVinci Assisted Radical Prostatectomy, Bilateral Pelvic Lymphadenectomy on  6-2-17.  Records available in [facility].

## 2017-06-08 ENCOUNTER — CARE COORDINATION (OUTPATIENT)
Dept: UROLOGY | Facility: CLINIC | Age: 67
End: 2017-06-08

## 2017-06-08 NOTE — PROGRESS NOTES
Urology Postop Phone Note:    Mr. Tunde Albarado is a 67 year old male who underwent DaVinci Assisted Radical Prostatectomy, Bilateral Pelvic Lymphadenectomy on 6/2/17 with Dr. Peace for a history of prostate cancer.  His postoperative course was unremarkable and the patient was d/c to home on 6/3/17.    Fevers/chills: Patient denies any fever or chills.  Eating/drinking: Patient is able to eat and drink without any complaints.  Bowel habits: Patient reports having a normal bowel movement.  Urine output Hi catheter  Incisions: Patient denies any signs and symptoms of infection.  Pain: Patient reports pain as a 0 out of 10.   Narcotics: The patient denies taking any pain medication.    Follow up appointment scheduled on 6/14/17 at 3:30 with Dr. Echeverria.    Informed the patient of the clinic triage nursing # ([phone] option 2) and urology resident on call # for after hours concerns.    Thanks,   Bruna Mcgregor   Department of Urologic Surgery

## 2017-06-09 LAB — COPATH REPORT: NORMAL

## 2017-06-14 ENCOUNTER — OFFICE VISIT (OUTPATIENT)
Dept: UROLOGY | Facility: CLINIC | Age: 67
End: 2017-06-14

## 2017-06-14 VITALS
SYSTOLIC BLOOD PRESSURE: 130 MMHG | WEIGHT: 219 LBS | HEIGHT: 72 IN | HEART RATE: 86 BPM | DIASTOLIC BLOOD PRESSURE: 68 MMHG | BODY MASS INDEX: 29.66 KG/M2

## 2017-06-14 DIAGNOSIS — C61 MALIGNANT NEOPLASM OF PROSTATE (H): Primary | ICD-10-CM

## 2017-06-14 ASSESSMENT — PAIN SCALES - GENERAL: PAINLEVEL: NO PAIN (0)

## 2017-06-14 NOTE — NURSING NOTE
Chief Complaint   Patient presents with     RECHECK     post op surgery       Blood pressure 130/68, pulse 86, height 1.829 m (6'), weight 99.3 kg (219 lb). Body mass index is 29.7 kg/(m^2).    Patient Active Problem List   Diagnosis     Malignant neoplasm of prostate (H)     Prostate cancer (H)       No Known Allergies    Current Outpatient Prescriptions   Medication Sig Dispense Refill     oxyCODONE (ROXICODONE) 5 MG IR tablet Take 1 tablet (5 mg) by mouth every 4 hours as needed for pain maximum 6 tablet(s) per day 30 tablet 0     acetaminophen (TYLENOL) 325 MG tablet Take 2 tablets (650 mg) by mouth every 4 hours as needed for mild pain 100 tablet 0     lisinopril-hydrochlorothiazide (PRINZIDE/ZESTORETIC) 10-12.5 MG per tablet Take 1 tablet by mouth every morning        ASPIRIN PO Take 81 mg by mouth every morning        SIMVASTATIN PO Take 20 mg by mouth At Bedtime       docusate sodium (COLACE) 100 MG tablet Take 100 mg by mouth daily (Patient not taking: Reported on 6/14/2017) 40 tablet 1     oxybutynin (DITROPAN XL) 5 MG 24 hr tablet Take 1 tablet (5 mg) by mouth daily as needed for bladder spasms (Patient not taking: Reported on 6/14/2017) 15 tablet 3       Social History   Substance Use Topics     Smoking status: Former Smoker     Packs/day: 0.50     Years: 32.00     Types: Cigarettes     Start date: 1968     Quit date: 2000     Smokeless tobacco: Not on file     Alcohol use Yes      Comment: EHSAN Colon  6/14/2017  2:57 PM

## 2017-06-14 NOTE — PROGRESS NOTES
Urology Clinic Note      Date: 6/14/2017  Time: 4:07 PM  Patient: Tunde Albarado  MRN: [id number]    HPI/Subjective: Tunde Albarado is a 67 year old male s/p RALP. Doing well. + BMs, good appetite.     Objective:  /68  Pulse 86  Ht 1.829 m (6')  Wt 99.3 kg (219 lb)  BMI 29.7 kg/m2  Gen: In NAD, conversant.  Resp: Breathing non-labored  CV: Extremities warm.  Abd: Soft, non-distended, non-tender, incisions healing well, Hi in place    Assessment & Plan: Tunde Albarado is a 67 year old male s/p RALP, pT2N0 prostate cancer, [place] 7     - Trial of void today    - F/u in 6 weeks with PSA    - Went over Kegel exercises and their importance.    - pt is not interested in sex (, + ED prior to surgery)     ETHAN Echeverria MD

## 2017-06-14 NOTE — PATIENT INSTRUCTIONS
Follow up with Dr. Echeverria in 6 weeks with PSA done prior.  Please bring this result back with you to your appointment.    It was a pleasure meeting with you today.  Thank you for allowing me and my team the privilege of caring for you today.  YOU are the reason we are here, and I truly hope we provided you with the excellent service you deserve.  Please let us know if there is anything else we can do for you so that we can be sure you are leaving completely satisfied with your care experience.      EHSAN Sheehan

## 2017-06-14 NOTE — MR AVS SNAPSHOT
After Visit Summary   6/14/2017    Tunde Albarado    MRN: [id number]           Patient Information     Date Of Birth          1950        Visit Information        Provider Department      6/14/2017 3:30 PM Bessy Echeverria MD [facility] Urology and [facility] for Prostate and Urologic Cancers        Today's Diagnoses     Malignant neoplasm of prostate (H)    -  1      Care Instructions    Follow up with Dr. Echeverria in 6 weeks with PSA done prior.  Please bring this result back with you to your appointment.    It was a pleasure meeting with you today.  Thank you for allowing me and my team the privilege of caring for you today.  YOU are the reason we are here, and I truly hope we provided you with the excellent service you deserve.  Please let us know if there is anything else we can do for you so that we can be sure you are leaving completely satisfied with your care experience.      Brunilda Porter JULI          Follow-ups after your visit        Follow-up notes from your care team     Return in about 6 weeks (around 7/26/2017) for Lab Work, Routine Visit.      Your next 10 appointments already scheduled     Aug 02, 2017  2:00 PM CDT   (Arrive by 1:45 PM)   Return Visit with Bessy Echeverria MD   [facility] Urology and [facility] for Prostate and Urologic Cancers ([facility])    [address] [place] [address] [phone]              Future tests that were ordered for you today     Open Future Orders        Priority Expected Expires Ordered    PSA ultra sensitive Routine 6/14/2017 6/14/2018 6/14/2017            Who to contact     Please call your clinic at [phone] to:    Ask questions about your health    Make or cancel appointments    Discuss your medicines    Learn about your test results    Speak to your doctor   If you have compliments or concerns about an experience at your clinic, or if you wish to file a complaint, please contact [facility]  [facility] Patient Relations at [phone] or email us at [email or web address]         Additional Information About Your Visit        9Cookieshart Information     Incentive Logic is an electronic gateway that provides easy, online access to your medical records. With Incent[employer] Logic, you can request a clinic appointment, read your test results, renew a prescription or communicate with your care team.     To sign up for In[employer] Logic visit the website at [email or web address]   You will be asked to enter the access code listed below, as well as some personal information. Please follow the directions to create your username and password.     Your access code is: [email or web address]  Expires: 2017  2:32 PM     Your access code will  in 90 days. If you need help or a new code, please contact your [facility] or call [phone] for assistance.        Care EveryWhere ID     This is your Care EveryWhere ID. This could be used by other organizations to access your [place] medical records  [id number]        Your Vitals Were     Pulse Height BMI (Body Mass Index)             86 1.829 m (6') 29.7 kg/m2          Blood Pressure from Last 3 Encounters:   17 130/68   17 122/65   17 152/82    Weight from Last 3 Encounters:   17 99.3 kg (219 lb)   17 101.3 kg (223 lb 5.2 oz)   17 104.1 kg (229 lb 8 oz)               Primary Care Provider Office Phone # Fax #    Mayur SPANN Lizet [phone]       [address] [address]        Thank you!     Thank you for choosing [facility] UROLOGY AND [facility] FOR PROSTATE AND UROLOGIC CANCERS  for your care. Our goal is always to provide you with excellent care. Hearing back from our patients is one way we can continue to improve our services. Please take a few minutes to complete the written survey that you may receive in the mail after your visit with us. Thank you!             Your  Updated Medication List - Protect others around you: Learn how to safely use, store and throw away your medicines at www.disposemymeds.org.          This list is accurate as of: 6/14/17  4:28 PM.  Always use your most recent med list.                   Brand Name Dispense Instructions for use    acetaminophen 325 MG tablet    TYLENOL    100 tablet    Take 2 tablets (650 mg) by mouth every 4 hours as needed for mild pain       ASPIRIN PO      Take 81 mg by mouth every morning       docusate sodium 100 MG tablet    COLACE    40 tablet    Take 100 mg by mouth daily       lisinopril-hydrochlorothiazide 10-12.5 MG per tablet    PRINZIDE/ZESTORETIC     Take 1 tablet by mouth every morning       oxybutynin 5 MG 24 hr tablet    DITROPAN XL    15 tablet    Take 1 tablet (5 mg) by mouth daily as needed for bladder spasms       oxyCODONE 5 MG IR tablet    ROXICODONE    30 tablet    Take 1 tablet (5 mg) by mouth every 4 hours as needed for pain maximum 6 tablet(s) per day       SIMVASTATIN PO      Take 20 mg by mouth At Bedtime

## 2017-07-24 ENCOUNTER — TRANSFERRED RECORDS (OUTPATIENT)
Dept: HEALTH INFORMATION MANAGEMENT | Facility: CLINIC | Age: 67
End: 2017-07-24

## 2017-07-25 ENCOUNTER — PRE VISIT (OUTPATIENT)
Dept: UROLOGY | Facility: CLINIC | Age: 67
End: 2017-07-25

## 2017-08-02 ENCOUNTER — OFFICE VISIT (OUTPATIENT)
Dept: UROLOGY | Facility: CLINIC | Age: 67
End: 2017-08-02

## 2017-08-02 VITALS
DIASTOLIC BLOOD PRESSURE: 81 MMHG | SYSTOLIC BLOOD PRESSURE: 154 MMHG | BODY MASS INDEX: 29.5 KG/M2 | HEIGHT: 72 IN | WEIGHT: 217.8 LBS | HEART RATE: 76 BPM

## 2017-08-02 DIAGNOSIS — C61 PROSTATE CANCER (H): Primary | ICD-10-CM

## 2017-08-02 ASSESSMENT — PAIN SCALES - GENERAL: PAINLEVEL: NO PAIN (0)

## 2017-08-02 NOTE — MR AVS SNAPSHOT
After Visit Summary   8/2/2017    Tunde Albarado    MRN: [id number]           Patient Information     Date Of Birth          1950        Visit Information        Provider Department      8/2/2017 2:00 PM Bessy Echeverria MD [facility] Urology and [facility] for Prostate and Urologic Cancers        Today's Diagnoses     Prostate cancer (H)    -  1      Care Instructions    Referral for Pelvic Floor Physical Therapy.    Follow up with Dr. Peace in 3 months with PSA prior to appointment.    It was a pleasure meeting with you today.  Thank you for allowing me and my team the privilege of caring for you today.  YOU are the reason we are here, and I truly hope we provided you with the excellent service you deserve.  Please let us know if there is anything else we can do for you so that we can be sure you are leaving completely satisfied with your care experience.      Brunilda Porter, [facility]          Follow-ups after your visit        Additional Services     [facility] Physical Therapy Referral       **This order will print in the [facility] Scheduling Office**    Physical Therapy, Hand Therapy and Chiropractic Care are available through:    *[facility]  *[facility]  *[facility]    Call one number to schedule at any of the above locations: [phone].    Your provider has referred you to: Physical Therapy at [facility] or [facility]    Indication/Reason for Referral: Men's Health (Please Complete Special Programs SmartList)  Onset of Illness:   Therapy Orders: Evaluate and Treat  Special Programs: Men's Health: Pelvic Floor Weakness/Incontinence - Specific Diagnosis -  S/P DaVinci Assisted Radical Prostatectomy, Bilateral Pelvic Lymphadenectomy on  6-2-17  Special Request: None    Wilfredo Villalobos      Additional Comments for the Therapist or Chiropractor:     Please be aware that coverage of these services is subject to the terms and limitations of your health insurance plan.   Call member services at your health plan with any benefit or coverage questions.      Please bring the following to your appointment:    *Your personal calendar for scheduling future appointments  *Comfortable clothing                  Follow-up notes from your care team     Return in about 3 months (around 11/2/2017) for Lab Work, Routine Visit.      Your next 10 appointments already scheduled     Oct 18, 2017  5:00 PM CDT   (Arrive by 4:45 PM)   Return Visit with Fanny Peace MD   [facility] Urology and [facility] for Prostate and Urologic Cancers ([facility] [place])    [address] [place] [address]  4th [facility] [address] [phone]              Future tests that were ordered for you today     Open Future Orders        Priority Expected Expires Ordered    PSA ultra sensitive Routine 8/2/2017 8/2/2018 8/2/2017            Who to contact     Please call your clinic at [phone] to:    Ask questions about your health    Make or cancel appointments    Discuss your medicines    Learn about your test results    Speak to your doctor   If you have compliments or concerns about an experience at your clinic, or if you wish to file a complaint, please contact [facility] Patient Relations at [phone] or email us at [email or web address]         Additional Information About Your Visit        RentablesÂ®hart Information     [employer] is an electronic gateway that provides easy, online access to your medical records. With [employer], you can request a clinic appointment, read your test results, renew a prescription or communicate with your care team.     To sign up for [employer] visit the website at [email or web address]   You will be asked to enter the access code listed below, as well as some personal information. Please follow the directions to create your username and password.     Your access code is: [email or web address]  Expires: 8/6/2017  2:32 PM     Your access code  will  in 90 days. If you need help or a new code, please contact your [facility] or call [phone] for assistance.        Care EveryWhere ID     This is your Care EveryWhere ID. This could be used by other organizations to access your [place] medical records  [id number]        Your Vitals Were     Pulse Height BMI (Body Mass Index)             76 1.829 m (6') 29.54 kg/m2          Blood Pressure from Last 3 Encounters:   17 154/81   17 130/68   17 122/65    Weight from Last 3 Encounters:   17 98.8 kg (217 lb 12.8 oz)   17 99.3 kg (219 lb)   17 101.3 kg (223 lb 5.2 oz)              We Performed the Following     [facility] Physical Therapy Referral          Today's Medication Changes          These changes are accurate as of: 17  2:43 PM.  If you have any questions, ask your nurse or doctor.               Stop taking these medicines if you haven't already. Please contact your care team if you have questions.     docusate sodium 100 MG tablet   Commonly known as:  COLACE   Stopped by:  Bessy Echeverria MD           oxybutynin 5 MG 24 hr tablet   Commonly known as:  DITROPAN XL   Stopped by:  Bessy Echeverria MD           oxyCODONE 5 MG IR tablet   Commonly known as:  ROXICODONE   Stopped by:  Bessy Echeverria MD                    Primary Care Provider Office Phone # Fax #    Mayur Hinds [phone]       [place] [address]        Equal Access to Services     [facility] CenterMARIA INES AH: Hadii sandy bettencourt hadasho Soomaali, waaxda luqadaha, qaybta kaalmada abbieegyada, martín sequeira. So [facility] [phone].    ATENCIÓN: Si habla español, tiene a fletcher disposición servicios gratuitos de asistencia lingüística. Llame al [phone].    We comply with applicable federal civil rights laws and [place] laws. We do not discriminate on the basis of race, color, national origin, age, disability  sex, sexual orientation or gender identity.            Thank you!     Thank you for choosing [facility] UROLOGY AND [facility] FOR PROSTATE AND UROLOGIC CANCERS  for your care. Our goal is always to provide you with excellent care. Hearing back from our patients is one way we can continue to improve our services. Please take a few minutes to complete the written survey that you may receive in the mail after your visit with us. Thank you!             Your Updated Medication List - Protect others around you: Learn how to safely use, store and throw away your medicines at www.disposemymeds.org.          This list is accurate as of: 8/2/17  2:43 PM.  Always use your most recent med list.                   Brand Name Dispense Instructions for use Diagnosis    acetaminophen 325 MG tablet    TYLENOL    100 tablet    Take 2 tablets (650 mg) by mouth every 4 hours as needed for mild pain    Malignant neoplasm of prostate (H)       ASPIRIN PO      Take 81 mg by mouth every morning        lisinopril-hydrochlorothiazide 10-12.5 MG per tablet    PRINZIDE/ZESTORETIC     Take 1 tablet by mouth every morning        SIMVASTATIN PO      Take 20 mg by mouth At Bedtime

## 2017-08-02 NOTE — NURSING NOTE
Chief Complaint   Patient presents with     RECHECK     Prostate cancer follow up with PSA       Initial Ht 1.829 m (6')  Wt 98.8 kg (217 lb 12.8 oz)  BMI 29.54 kg/m2 Estimated body mass index is 29.54 kg/(m^2) as calculated from the following:    Height as of this encounter: 1.829 m (6').    Weight as of this encounter: 98.8 kg (217 lb 12.8 oz).  Medication Reconciliation: complete     EHSAN Sheehan

## 2017-08-02 NOTE — PROGRESS NOTES
Urology Clinic Note      Date: 8/2/2017  Time: 2:26 PM  Patient: Tunde Albarado  MRN: [id number]    HPI/Subjective: Tunde Albarado is a 67 year old male with CaP, s/p RALP. Doing well. Down to 1 pad per day, minimal urinary incontinence. No erections, not interested in sexual relation at this time.    Objective:  /81  Pulse 76  Ht 1.829 m (6')  Wt 98.8 kg (217 lb 12.8 oz)  BMI 29.54 kg/m2  Gen: In NAD, conversant.  Resp: Breathing non-labored  CV: Extremities warm.  Abd: Soft, non-distended, non-tender.    Assessment & Plan: Tunde Albarado is a 67 year old male s/p RALP for CaP     - psa undetectable    - pelvic floor rehab/PT, continue Kegels    - f/u in 3 months with PSA         ETHAN Echeverria MD

## 2017-08-02 NOTE — LETTER
8/2/2017       RE: Tunde Albarado  [address]  [place] [facility] [address]     Dear Colleague,    Thank you for referring your patient, Tunde Albarado, to the [facility] UROLOGY AND INST FOR PROSTATE AND UROLOGIC CANCERS at [facility]. Please see a copy of my visit note below.    Urology Clinic Note    Date: 8/2/2017  Time: 2:26 PM  Patient: Tunde Albarado  MRN: [id number]    HPI/Subjective: Tunde Albarado is a 67 year old male with CaP, s/p RALP. Doing well. Down to 1 pad per day, minimal urinary incontinence. No erections, not interested in sexual relation at this time.    Objective:  /81  Pulse 76  Ht 1.829 m (6')  Wt 98.8 kg (217 lb 12.8 oz)  BMI 29.54 kg/m2  Gen: In NAD, conversant.  Resp: Breathing non-labored  CV: Extremities warm.  Abd: Soft, non-distended, non-tender.    Assessment & Plan: Tunde Albarado is a 67 year old male s/p RALP for CaP   - psa undetectable    - pelvic floor rehab/PT, continue Kegels    - f/u in 3 months with PSA         ETHAN Echeverria MD

## 2017-08-02 NOTE — PATIENT INSTRUCTIONS
Referral for Pelvic Floor Physical Therapy.    Follow up with Dr. Peace in 3 months with PSA prior to appointment.    It was a pleasure meeting with you today.  Thank you for allowing me and my team the privilege of caring for you today.  YOU are the reason we are here, and I truly hope we provided you with the excellent service you deserve.  Please let us know if there is anything else we can do for you so that we can be sure you are leaving completely satisfied with your care experience.      EHSAN Sheehan

## 2017-10-09 ENCOUNTER — TRANSFERRED RECORDS (OUTPATIENT)
Dept: HEALTH INFORMATION MANAGEMENT | Facility: CLINIC | Age: 67
End: 2017-10-09

## 2017-10-13 ENCOUNTER — PRE VISIT (OUTPATIENT)
Dept: UROLOGY | Facility: CLINIC | Age: 67
End: 2017-10-13

## 2017-10-17 ENCOUNTER — OFFICE VISIT (OUTPATIENT)
Dept: UROLOGY | Facility: CLINIC | Age: 67
End: 2017-10-17

## 2017-10-17 VITALS
BODY MASS INDEX: 29.19 KG/M2 | WEIGHT: 215.5 LBS | SYSTOLIC BLOOD PRESSURE: 145 MMHG | HEART RATE: 56 BPM | DIASTOLIC BLOOD PRESSURE: 90 MMHG | HEIGHT: 72 IN

## 2017-10-17 DIAGNOSIS — C61 MALIGNANT NEOPLASM OF PROSTATE (H): Primary | ICD-10-CM

## 2017-10-17 ASSESSMENT — PAIN SCALES - GENERAL: PAINLEVEL: NO PAIN (0)

## 2017-10-17 NOTE — MR AVS SNAPSHOT
After Visit Summary   10/17/2017    Tunde Albarado    MRN: [id number]           Patient Information     Date Of Birth          1950        Visit Information        Provider Department      10/17/2017 11:30 AM Fifi Au PA [facility] Urology and [facility] for Prostate and Urologic Cancers        Today's Diagnoses     Malignant neoplasm of prostate (H)    -  1      Care Instructions    Return in 3 months with a PSA    PABLO LandaC          Follow-ups after your visit        Your next 10 appointments already scheduled     2018 12:00 PM CST   (Arrive by 11:45 AM)   Return Visit with VIRGINIA Villafuerte [facility] Urology and [facility] for Prostate and Urologic Cancers ([facility] [place])    [address] [facility]  4th [facility] [address] [phone]              Who to contact     Please call your clinic at [phone] to:    Ask questions about your health    Make or cancel appointments    Discuss your medicines    Learn about your test results    Speak to your doctor   If you have compliments or concerns about an experience at your clinic, or if you wish to file a complaint, please contact [facility] Physicians Patient Relations at [phone] or email us at [email or web address]         Additional Information About Your Visit        MyChart Information     Well[employer] is an electronic gateway that provides easy, online access to your medical records. With [employer], you can request a clinic appointment, read your test results, renew a prescription or communicate with your care team.     To sign up for [employer] visit the website at [email or web address]   You will be asked to enter the access code listed below, as well as some personal information. Please follow the directions to create your username and password.     Your access code is: [email or web address]  Expires: 2018  6:31 AM     Your access code will  in 90 days. If  you need help or a new code, please contact your [facility] Clinic or call [phone] for assistance.        Care EveryWhere ID     This is your Care EveryWhere ID. This could be used by other organizations to access your [place] medical records  [id number]        Your Vitals Were     Pulse Height BMI (Body Mass Index)             56 1.829 m (6') 29.23 kg/m2          Blood Pressure from Last 3 Encounters:   10/17/17 145/90   08/02/17 154/81   06/14/17 130/68    Weight from Last 3 Encounters:   10/17/17 97.8 kg (215 lb 8 oz)   08/02/17 98.8 kg (217 lb 12.8 oz)   06/14/17 99.3 kg (219 lb)              Today, you had the following     No orders found for display       Primary Care Provider Office Phone # Fax #    Mayur Hinds [phone]       [place] [address]        Equal Access to Services     ZORAIDA MCCOY : Hadii aad ku hadasho Soomaali, waaxda luqadaha, qaybta kaalmada adeegyada, waxay idiin hayaan adeeg khararobi la'capricen . So [facility] [phone].    ATENCIÓN: Si habla español, tiene a fletcher disposición servicios gratuitos de asistencia lingüística. Llame al [phone].    We comply with applicable federal civil rights laws and [place] laws. We do not discriminate on the basis of race, color, national origin, age, disability, sex, sexual orientation, or gender identity.            Thank you!     Thank you for choosing [facility] UROLOGY AND [facility] FOR PROSTATE AND UROLOGIC CANCERS  for your care. Our goal is always to provide you with excellent care. Hearing back from our patients is one way we can continue to improve our services. Please take a few minutes to complete the written survey that you may receive in the mail after your visit with us. Thank you!             Your Updated Medication List - Protect others around you: Learn how to safely use, store and throw away your medicines at www.disposemymeds.org.          This list is accurate as  of: 10/17/17 12:06 PM.  Always use your most recent med list.                   Brand Name Dispense Instructions for use Diagnosis    acetaminophen 325 MG tablet    TYLENOL    100 tablet    Take 2 tablets (650 mg) by mouth every 4 hours as needed for mild pain    Malignant neoplasm of prostate (H)       ASPIRIN PO      Take 81 mg by mouth every morning        lisinopril-hydrochlorothiazide 10-12.5 MG per tablet    PRINZIDE/ZESTORETIC     Take 1 tablet by mouth every morning        SIMVASTATIN PO      Take 20 mg by mouth At Bedtime

## 2017-10-17 NOTE — PROGRESS NOTES
"HPI: Mr. Tunde Albarado is a 67 year old year old male presenting today for evaluation of chief complaint(s): RECHECK (Prostate cancer follow up)    Mr. Albarado has PMH significant for HTN, HLD and elevated PSA (6.2ng/dL) then prostate cancer s/p RALP with [facility] (Dr. Peace) on 6/2/17, therefore he is 4 months out.  Path showed: Ami 7 (3+4) adenoca, pT2N0 with no EPE, SVI, LVI, PNI, and the margins were negative.     He still leaks with coughing, sneezing.  Also will leak if he stands up after sitting for awhile.  Will have postvoid dribble after moving from the toilet. Although, regarding the incontinence --> the last 1.5 weeks his control seems better.  Currently using 1ppd, typically.  At night, nocturia x 1.  Stays dry while sleeping. Is still seeing PFPT, and has an upcoming appointment.  Finds it valuable.  IPSS 9 \"Unhappy\"    Regarding ED:  No erections.  KRISTY 1. Hasn't used any Viagra at all so far.  Isn't interested in trying until he gets the incontinence under better control.    No pain, edema, fevers, chills.  Eating normally. BMs WNL.   Works part time as a .     Review of PSA  10/9/17 <0.1ng/dL  7/24/17 <0.1ng/dL    Current Outpatient Prescriptions   Medication Sig Dispense Refill     acetaminophen (TYLENOL) 325 MG tablet Take 2 tablets (650 mg) by mouth every 4 hours as needed for mild pain 100 tablet 0     lisinopril-hydrochlorothiazide (PRINZIDE/ZESTORETIC) 10-12.5 MG per tablet Take 1 tablet by mouth every morning        ASPIRIN PO Take 81 mg by mouth every morning        SIMVASTATIN PO Take 20 mg by mouth At Bedtime         ALLERGIES: Review of patient's allergies indicates no known allergies.      REVIEW OF SYSTEMS:  As above in HPI    GENERAL PHYSICAL EXAM:   Vitals: /90  Pulse 56  Ht 1.829 m (6')  Wt 97.8 kg (215 lb 8 oz)  BMI 29.23 kg/m2  Body mass index is 29.23 kg/(m^2).    GENERAL: Well groomed, well developed, well nourished male in NAD.  GI: Soft, NT, ND.  All " incisions well-healed without erythema or tenderness.   NEURO: Alert and oriented x 3.  PSYCH: Normal mood and affect, pleasant and agreeable during interview and exam.    LABS: The last test results for Mr. Tunde Albarado were reviewed:  PSA - No results found for: PSA  BMP -   Recent Labs   Lab Test  06/03/17   0803  06/02/17 2112 05/24/17   1728   NA  140  140  140   POTASSIUM  4.4  4.6  4.4   CHLORIDE  108  107  105   CO2  25  25  29   BUN  21  20  20   CR  1.25  1.31*  1.14   GLC  122*  212*  123*   ISABELA  8.2*  8.4*  9.7       CBC -   Recent Labs   Lab Test  06/03/17   0803 06/02/17 2112 05/24/17   1728   WBC  11.1*  12.0*  9.1   HGB  10.7*  11.6*  15.5   PLT  147*  179  209       ASSESSMENT:   1) prostate cancer  2) incontinence  3) Erectile dysfunction    PLAN:   - Continue PFPT  - At next visit will explore whether urinary symptoms may have an urge component - will consider OAB medication  - Will consider Viagra vs Cialis at next visit (if incontinence is improved)   - PCP checks his PSAs.   - Return 3 months with a PSA    Bobbi Au PA-C  Department of Urologic Surgery

## 2017-10-17 NOTE — NURSING NOTE
Chief Complaint   Patient presents with     RECHECK     Prostate cancer follow up       Initial Ht 1.829 m (6')  Wt 97.8 kg (215 lb 8 oz)  BMI 29.23 kg/m2 Estimated body mass index is 29.23 kg/(m^2) as calculated from the following:    Height as of this encounter: 1.829 m (6').    Weight as of this encounter: 97.8 kg (215 lb 8 oz).  Medication Reconciliation: complete     EHSAN Sheehan

## 2017-10-17 NOTE — LETTER
"10/17/2017     RE: Tunde Albarado  [address]  [place] [address]     Dear Colleague,    Thank you for referring your patient, Tunde Albarado, to the [facility] UROLOGY AND INST FOR PROSTATE AND UROLOGIC CANCERS at [facility]. Please see a copy of my visit note below.    HPI: Mr. Tunde Albarado is a 67 year old year old male presenting today for evaluation of chief complaint(s): RECHECK (Prostate cancer follow up)    Mr. Albarado has PMH significant for HTN, HLD and elevated PSA (6.2ng/dL) then prostate cancer s/p RALP with BPLND (Dr. Peace) on 6/2/17, therefore he is 4 months out.  Path showed: Ami 7 (3+4) adenoca, pT2N0 with no EPE, SVI, LVI, PNI, and the margins were negative.     He still leaks with coughing, sneezing.  Also will leak if he stands up after sitting for awhile.  Will have postvoid dribble after moving from the toilet. Although, regarding the incontinence --> the last 1.5 weeks his control seems better.  Currently using 1ppd, typically.  At night, nocturia x 1.  Stays dry while sleeping. Is still seeing PFPT, and has an upcoming appointment.  Finds it valuable.  IPSS 9 \"Unhappy\"    Regarding ED:  No erections.  KRISTY 1. Hasn't used any Viagra at all so far.  Isn't interested in trying until he gets the incontinence under better control.    No pain, edema, fevers, chills.  Eating normally. BMs WNL.   Works part time as a .     Review of PSA  10/9/17 <0.1ng/dL  7/24/17 <0.1ng/dL    Current Outpatient Prescriptions   Medication Sig Dispense Refill     acetaminophen (TYLENOL) 325 MG tablet Take 2 tablets (650 mg) by mouth every 4 hours as needed for mild pain 100 tablet 0     lisinopril-hydrochlorothiazide (PRINZIDE/ZESTORETIC) 10-12.5 MG per tablet Take 1 tablet by mouth every morning        ASPIRIN PO Take 81 mg by mouth every morning        SIMVASTATIN PO Take 20 mg by mouth At Bedtime         ALLERGIES: Review of patient's allergies indicates " no known allergies.      REVIEW OF SYSTEMS:  As above in HPI    GENERAL PHYSICAL EXAM:   Vitals: /90  Pulse 56  Ht 1.829 m (6')  Wt 97.8 kg (215 lb 8 oz)  BMI 29.23 kg/m2  Body mass index is 29.23 kg/(m^2).    GENERAL: Well groomed, well developed, well nourished male in NAD.  GI: Soft, NT, ND.  All incisions well-healed without erythema or tenderness.   NEURO: Alert and oriented x 3.  PSYCH: Normal mood and affect, pleasant and agreeable during interview and exam.    LABS: The last test results for Mr. Tunde Albarado were reviewed:  PSA - No results found for: PSA  BMP -   Recent Labs   Lab Test  06/03/17 0803 06/02/17 2112 05/24/17   1728   NA  140  140  140   POTASSIUM  4.4  4.6  4.4   CHLORIDE  108  107  105   CO2  25  25  29   BUN  21  20  20   CR  1.25  1.31*  1.14   GLC  122*  212*  123*   ISABELA  8.2*  8.4*  9.7       CBC -   Recent Labs   Lab Test  06/03/17 0803 06/02/17 2112 05/24/17   1728   WBC  11.1*  12.0*  9.1   HGB  10.7*  11.6*  15.5   PLT  147*  179  209       ASSESSMENT:   1) prostate cancer  2) incontinence  3) Erectile dysfunction    PLAN:   - Continue PFPT  - At next visit will explore whether urinary symptoms may have an urge component - will consider OAB medication  - Will consider Viagra vs Cialis at next visit (if incontinence is improved)   - PCP checks his PSAs.   - Return 3 months with a PSA    Bobbi Au PA-C  Department of Urologic Surgery

## 2018-01-05 ENCOUNTER — TRANSFERRED RECORDS (OUTPATIENT)
Dept: HEALTH INFORMATION MANAGEMENT | Facility: CLINIC | Age: 68
End: 2018-01-05

## 2018-01-19 ENCOUNTER — PRE VISIT (OUTPATIENT)
Dept: UROLOGY | Facility: CLINIC | Age: 68
End: 2018-01-19

## 2018-03-09 ENCOUNTER — PRE VISIT (OUTPATIENT)
Dept: UROLOGY | Facility: CLINIC | Age: 68
End: 2018-03-09

## 2018-03-13 ENCOUNTER — OFFICE VISIT (OUTPATIENT)
Dept: UROLOGY | Facility: CLINIC | Age: 68
End: 2018-03-13
Payer: COMMERCIAL

## 2018-03-13 VITALS
WEIGHT: 222.3 LBS | BODY MASS INDEX: 30.11 KG/M2 | DIASTOLIC BLOOD PRESSURE: 90 MMHG | SYSTOLIC BLOOD PRESSURE: 145 MMHG | HEIGHT: 72 IN | HEART RATE: 82 BPM

## 2018-03-13 DIAGNOSIS — C61 MALIGNANT NEOPLASM OF PROSTATE (H): Primary | ICD-10-CM

## 2018-03-13 ASSESSMENT — PAIN SCALES - GENERAL: PAINLEVEL: NO PAIN (0)

## 2018-03-13 NOTE — PATIENT INSTRUCTIONS
- Return in June with a PSA  - Cut back on your coffee  - Start walking everyday!    Bobbi Au PA-C

## 2018-03-13 NOTE — MR AVS SNAPSHOT
After Visit Summary   3/13/2018    Tunde Albarado    MRN: [id number]           Patient Information     Date Of Birth          1950        Visit Information        Provider Department      3/13/2018 12:00 PM Fifi Au PA [facility] Urology and [facility] for Prostate and Urologic Cancers        Today's Diagnoses     Malignant neoplasm of prostate (H)    -  1      Care Instructions    - Return in  with a PSA  - Cut back on your coffee  - Start walking everyday!    VIRGINIA Landa-C          Follow-ups after your visit        Your next 10 appointments already scheduled     2018 12:00 PM CDT   (Arrive by 11:45 AM)   Return Visit with VIRGINIA Villafuerte [facility] Urology and [facility] for Prostate and Urologic Cancers ([facility])    [address] [place] [address] [phone]              Future tests that were ordered for you today     Open Future Orders        Priority Expected Expires Ordered    PSA tumor marker Routine 2018 8/10/2018 3/13/2018            Who to contact     Please call your clinic at [phone] to:    Ask questions about your health    Make or cancel appointments    Discuss your medicines    Learn about your test results    Speak to your doctor            Additional Information About Your Visit        MyChart Information     Azimat is an electronic gateway that provides easy, online access to your medical records. With [employer], you can request a clinic appointment, read your test results, renew a prescription or communicate with your care team.     To sign up for Azimat visit the website at [email or web address]   You will be asked to enter the access code listed below, as well as some personal information. Please follow the directions to create your username and password.     Your access code is: [email or web address]  Expires: 2018  7:30 AM     Your access code will  in 90 days. If you need help  or a new code, please contact your [facility] or call [phone] for assistance.        Care EveryWhere ID     This is your Care EveryWhere ID. This could be used by other organizations to access your [place] medical records  [id number]        Your Vitals Were     Pulse Height BMI (Body Mass Index)             82 1.829 m (6') 30.15 kg/m2          Blood Pressure from Last 3 Encounters:   03/13/18 145/90   10/17/17 145/90   08/02/17 154/81    Weight from Last 3 Encounters:   03/13/18 100.8 kg (222 lb 4.8 oz)   10/17/17 97.8 kg (215 lb 8 oz)   08/02/17 98.8 kg (217 lb 12.8 oz)               Primary Care Provider Office Phone # Fax #    Mayur Hinds [phone]       [place] [address]        Equal Access to Services     ZORAIDA MCCOY AH: Hadii aad ku hadasho Soomaali, waaxda luqadaha, qaybta kaalmada adeegyada, waxay idiin hayaan thomas khararobi miguel . So [facility] [phone].    ATENCIÓN: Si habla español, tiene a fletcher disposición servicios gratuitos de asistencia lingüística. Llame al [phone].    We comply with applicable federal civil rights laws and [place] laws. We do not discriminate on the basis of race, color, national origin, age, disability, sex, sexual orientation, or gender identity.            Thank you!     Thank you for choosing [facility] UROLOGY AND [facility] FOR PROSTATE AND UROLOGIC CANCERS  for your care. Our goal is always to provide you with excellent care. Hearing back from our patients is one way we can continue to improve our services. Please take a few minutes to complete the written survey that you may receive in the mail after your visit with us. Thank you!             Your Updated Medication List - Protect others around you: Learn how to safely use, store and throw away your medicines at www.disposemymeds.org.          This list is accurate as of 3/13/18  1:10 PM.  Always use your most recent med list.                    Brand Name Dispense Instructions for use Diagnosis    acetaminophen 325 MG tablet    TYLENOL    100 tablet    Take 2 tablets (650 mg) by mouth every 4 hours as needed for mild pain    Malignant neoplasm of prostate (H)       ASPIRIN PO      Take 81 mg by mouth every morning        lisinopril-hydrochlorothiazide 10-12.5 MG per tablet    PRINZIDE/ZESTORETIC     Take 1 tablet by mouth every morning        SIMVASTATIN PO      Take 20 mg by mouth At Bedtime

## 2018-03-13 NOTE — PROGRESS NOTES
"HPI: Mr. Tunde Albarado is a 68 year old year old male presenting today for evaluation of chief complaint(s): RECHECK (Prostate cancer follow up with PSA)    Mr. Albarado has PMH significant for HTN, HLD and elevated PSA (6.2ng/dL) then prostate cancer s/p RALP with BPLND (Dr. Peace) on 6/2/17, therefore he is 9 months out.  Path showed: Ami 7 (3+4) adenoca, pT2N0 with no EPE, SVI, LVI, PNI, and the margins were negative. He was last seen 10/17/17 with persistent stress incontinence - 1ppd.  IPSS 9 \"unhappy\". KRISTY was 1.     Today he presents in routine followup. He tells me it is mostly with activity.  But then he also suggests that most leakage is in the morning \"with coffee\".  Two cups in the morning then one after lunch and one after dinner.  Works part time as a . Doesn't go to the office til 10-10:30.  Is only there for an hour or so then goes home again.  Unsure if he leaks with exercise because he only exercises when the weather is nice.  Will plan to start taking long walks again now that the snow is melting.  Still using 1ppd and doing PFPT exercises routinely. IPSS 1 \"unhappy\"     KRISTY 1 - doesn't concern him at this point.  Wants to work on continence first    No edema, fevers, chills.   No new medications or PMH or surgeries since the last visit.   Daughter is a PA in orthopedics.     Review of PSA  01/05/18 <0.01ng/dL  10/9/17 <0.1ng/dL  7/24/17 <0.1ng/dL  Current Outpatient Prescriptions   Medication Sig Dispense Refill     acetaminophen (TYLENOL) 325 MG tablet Take 2 tablets (650 mg) by mouth every 4 hours as needed for mild pain 100 tablet 0     lisinopril-hydrochlorothiazide (PRINZIDE/ZESTORETIC) 10-12.5 MG per tablet Take 1 tablet by mouth every morning        ASPIRIN PO Take 81 mg by mouth every morning        SIMVASTATIN PO Take 20 mg by mouth At Bedtime         ALLERGIES: Review of patient's allergies indicates no known allergies.      REVIEW OF SYSTEMS:  As above in " HPI    GENERAL PHYSICAL EXAM:   Vitals: /90  Pulse 82  Ht 1.829 m (6')  Wt 100.8 kg (222 lb 4.8 oz)  BMI 30.15 kg/m2  Body mass index is 30.15 kg/(m^2).    GENERAL: Well groomed, well developed, well nourished male in NAD.  No LE edema.  NEURO: Alert and oriented x 3.  PSYCH: Normal mood and affect, pleasant and agreeable during interview and exam.    LABS: The last test results for Mr. Tunde Albarado were reviewed:  PSA - No results found for: PSA  BMP -   Recent Labs   Lab Test  06/03/17   0803  06/02/17 2112 05/24/17   1728   NA  140  140  140   POTASSIUM  4.4  4.6  4.4   CHLORIDE  108  107  105   CO2  25  25  29   BUN  21  20  20   CR  1.25  1.31*  1.14   GLC  122*  212*  123*   ISABELA  8.2*  8.4*  9.7       CBC -   Recent Labs   Lab Test  06/03/17 0803 06/02/17 2112 05/24/17   1728   WBC  11.1*  12.0*  9.1   HGB  10.7*  11.6*  15.5   PLT  147*  179  209       ASSESSMENT:   1) prostate cancer  2) Urinary incontinence  3) ED    PLAN:   - Return in June for a PSA and office visit  - Could try cutting back on coffee.  Suspect he has some component of OAB since he is seeing most of his leakage in the morning.   - Could try an anticholinergic, but patient wants to first try limiting coffee   - Start walking everyday (because this is good for his health)    Bobbi Au PA-C  Department of Urologic Surgery

## 2018-03-13 NOTE — NURSING NOTE
Chief Complaint   Patient presents with     RECHECK     Prostate cancer follow up with PSA       Initial Ht 1.829 m (6')  Wt 100.8 kg (222 lb 4.8 oz)  BMI 30.15 kg/m2 Estimated body mass index is 30.15 kg/(m^2) as calculated from the following:    Height as of this encounter: 1.829 m (6').    Weight as of this encounter: 100.8 kg (222 lb 4.8 oz).  Medication Reconciliation: complete     EHSAN Sheehan

## 2018-03-13 NOTE — LETTER
"3/13/2018       RE: Tunde Albarado  [address]  [place] [address]     Dear Colleague,    Thank you for referring your patient, Tunde Albarado, to the [facility] UROLOGY AND INST FOR PROSTATE AND UROLOGIC CANCERS at [facility]. Please see a copy of my visit note below.    HPI: Mr. Tunde Albarado is a 68 year old year old male presenting today for evaluation of chief complaint(s): RECHECK (Prostate cancer follow up with PSA)    Mr. Albarado has PMH significant for HTN, HLD and elevated PSA (6.2ng/dL) then prostate cancer s/p RALP with BPLND (Dr. Peace) on 6/2/17, therefore he is 9 months out.  Path showed: [place] 7 (3+4) adenoca, pT2N0 with no EPE, SVI, LVI, PNI, and the margins were negative. He was last seen 10/17/17 with persistent stress incontinence - 1ppd.  IPSS 9 \"unhappy\". KRISTY was 1.     Today he presents in routine followup. He tells me it is mostly with activity.  But then he also suggests that most leakage is in the morning \"with coffee\".  Two cups in the morning then one after lunch and one after dinner.  Works part time as a . Doesn't go to the office til 10-10:30.  Is only there for an hour or so then goes home again.  Unsure if he leaks with exercise because he only exercises when the weather is nice.  Will plan to start taking long walks again now that the snow is melting.  Still using 1ppd and doing PFPT exercises routinely. IPSS 1 \"unhappy\"     KRISTY 1 - doesn't concern him at this point.  Wants to work on continence first    No edema, fevers, chills.   No new medications or PMH or surgeries since the last visit.   Daughter is a PA in orthopedics.     Review of PSA  01/05/18 <0.01ng/dL  10/9/17 <0.1ng/dL  7/24/17 <0.1ng/dL  Current Outpatient Prescriptions   Medication Sig Dispense Refill     acetaminophen (TYLENOL) 325 MG tablet Take 2 tablets (650 mg) by mouth every 4 hours as needed for mild pain 100 tablet 0     " lisinopril-hydrochlorothiazide (PRINZIDE/ZESTORETIC) 10-12.5 MG per tablet Take 1 tablet by mouth every morning        ASPIRIN PO Take 81 mg by mouth every morning        SIMVASTATIN PO Take 20 mg by mouth At Bedtime         ALLERGIES: Review of patient's allergies indicates no known allergies.      REVIEW OF SYSTEMS:  As above in HPI    GENERAL PHYSICAL EXAM:   Vitals: /90  Pulse 82  Ht 1.829 m (6')  Wt 100.8 kg (222 lb 4.8 oz)  BMI 30.15 kg/m2  Body mass index is 30.15 kg/(m^2).    GENERAL: Well groomed, well developed, well nourished male in NAD.  No LE edema.  NEURO: Alert and oriented x 3.  PSYCH: Normal mood and affect, pleasant and agreeable during interview and exam.    LABS: The last test results for Mr. Tunde Albarado were reviewed:  PSA - No results found for: PSA  BMP -   Recent Labs   Lab Test  06/03/17   0803 06/02/17 2112 05/24/17   1728   NA  140  140  140   POTASSIUM  4.4  4.6  4.4   CHLORIDE  108  107  105   CO2  25  25  29   BUN  21  20  20   CR  1.25  1.31*  1.14   GLC  122*  212*  123*   ISABELA  8.2*  8.4*  9.7       CBC -   Recent Labs   Lab Test  06/03/17 0803 06/02/17 2112 05/24/17   1728   WBC  11.1*  12.0*  9.1   HGB  10.7*  11.6*  15.5   PLT  147*  179  209       ASSESSMENT:   1) prostate cancer  2) Urinary incontinence  3) ED    PLAN:   - Return in June for a PSA and office visit  - Could try cutting back on coffee.  Suspect he has some component of OAB since he is seeing most of his leakage in the morning.   - Could try an anticholinergic, but patient wants to first try limiting coffee   - Start walking everyday (because this is good for his health)    Bobbi Au PA-C  Department of Urologic Surgery

## 2018-06-06 ENCOUNTER — PRE VISIT (OUTPATIENT)
Dept: UROLOGY | Facility: CLINIC | Age: 68
End: 2018-06-06

## 2018-06-06 NOTE — TELEPHONE ENCOUNTER
Prostate cancer follow up with PSA.  Patient to hand carry labs results.  All other records available in [facility].

## 2018-06-07 ENCOUNTER — TRANSFERRED RECORDS (OUTPATIENT)
Dept: HEALTH INFORMATION MANAGEMENT | Facility: CLINIC | Age: 68
End: 2018-06-07

## 2018-06-12 ENCOUNTER — OFFICE VISIT (OUTPATIENT)
Dept: UROLOGY | Facility: CLINIC | Age: 68
End: 2018-06-12
Payer: COMMERCIAL

## 2018-06-12 VITALS
WEIGHT: 218 LBS | HEIGHT: 72 IN | HEART RATE: 85 BPM | DIASTOLIC BLOOD PRESSURE: 87 MMHG | SYSTOLIC BLOOD PRESSURE: 134 MMHG | BODY MASS INDEX: 29.53 KG/M2

## 2018-06-12 DIAGNOSIS — C61 MALIGNANT NEOPLASM OF PROSTATE (H): Primary | ICD-10-CM

## 2018-06-12 ASSESSMENT — PAIN SCALES - GENERAL: PAINLEVEL: NO PAIN (0)

## 2018-06-12 NOTE — NURSING NOTE
Chief Complaint   Patient presents with     RECHECK     Prostate cancer follow up with PSA.     EHSAN Sheehan

## 2018-06-12 NOTE — MR AVS SNAPSHOT
After Visit Summary   2018    Tunde Albarado    MRN: [id number]           Patient Information     Date Of Birth          1950        Visit Information        Provider Department      2018 12:00 PM Fifi Au PA [facility] Urology and [facility] for Prostate and Urologic Cancers        Today's Diagnoses     Malignant neoplasm of prostate (H)    -  1      Care Instructions    - Encourage to exercise - continue walking 3 miles per day  - Continue pelvic floor exercises at home.   - Limiting coffee  - Return 3 months with a PSA recheck    Bobbi Au PA-C            Follow-ups after your visit        Your next 10 appointments already scheduled     Sep 11, 2018 12:00 PM CDT   (Arrive by 11:45 AM)   Return Visit with VIRGINIA Villafuerte [facility] Urology and [facility] for Prostate and Urologic Cancers ([facility])    [address] [place] [address] [phone]              Who to contact     Please call your clinic at [phone] to:    Ask questions about your health    Make or cancel appointments    Discuss your medicines    Learn about your test results    Speak to your doctor            Additional Information About Your Visit        MyChart Information     [employer] is an electronic gateway that provides easy, online access to your medical records. With [employer], you can request a clinic appointment, read your test results, renew a prescription or communicate with your care team.     To sign up for [employer] visit the website at [email or web address]   You will be asked to enter the access code listed below, as well as some personal information. Please follow the directions to create your username and password.     Your access code is: [email or web address]  Expires: 2018  6:30 AM     Your access code will  in 90 days. If you need help or a new code, please contact your [facility] or call  [phone] for assistance.        Care EveryWhere ID     This is your Care EveryWhere ID. This could be used by other organizations to access your [place] medical records  [id number]        Your Vitals Were     Pulse Height BMI (Body Mass Index)             85 1.829 m (6') 29.57 kg/m2          Blood Pressure from Last 3 Encounters:   06/12/18 134/87   03/13/18 145/90   10/17/17 145/90    Weight from Last 3 Encounters:   06/12/18 98.9 kg (218 lb)   03/13/18 100.8 kg (222 lb 4.8 oz)   10/17/17 97.8 kg (215 lb 8 oz)              Today, you had the following     No orders found for display       Primary Care Provider Office Phone # Fax #    Mayur Hinds [phone]       [address] [address]        Equal Access to Services     ZORAIDA MCCOY : Hadii aad ku hadasho Soomaali, waaxda luqadaha, qaybta kaalmada adeegyada, waxay lamarin haycapricen thomas miguel . So [facility] [phone].    ATENCIÓN: Si habla español, tiene a fletcher disposición servicios gratuitos de asistencia lingüística. Rupert al [phone].    We comply with applicable federal civil rights laws and [place] laws. We do not discriminate on the basis of race, color, national origin, age, disability, sex, sexual orientation, or gender identity.            Thank you!     Thank you for choosing [facility] UROLOGY AND [facility] FOR PROSTATE AND UROLOGIC CANCERS  for your care. Our goal is always to provide you with excellent care. Hearing back from our patients is one way we can continue to improve our services. Please take a few minutes to complete the written survey that you may receive in the mail after your visit with us. Thank you!             Your Updated Medication List - Protect others around you: Learn how to safely use, store and throw away your medicines at www.disposemymeds.org.          This list is accurate as of 6/12/18 12:24 PM.  Always use your most recent med list.                   Brand Name Dispense  Instructions for use Diagnosis    acetaminophen 325 MG tablet    TYLENOL    100 tablet    Take 2 tablets (650 mg) by mouth every 4 hours as needed for mild pain    Malignant neoplasm of prostate (H)       ASPIRIN PO      Take 81 mg by mouth every morning        lisinopril-hydrochlorothiazide 10-12.5 MG per tablet    PRINZIDE/ZESTORETIC     Take 1 tablet by mouth every morning        SIMVASTATIN PO      Take 20 mg by mouth At Bedtime

## 2018-06-12 NOTE — PROGRESS NOTES
"HPI: Mr. Tunde Albarado is a 68 year old year old male presenting today for evaluation of chief complaint(s): RECHECK (Prostate cancer follow up with PSA.)    Mr. Albarado has PMH significant for HTN, HLD and elevated PSA (6.2ng/dL) then prostate cancer s/p RALP with BPLND (Dr. Peace) on 6/2/17.  Path showed: [place] 7 (3+4) adenoca, pT2N0 with no EPE, SVI, LVI, PNI, and the margins were negative.     He was last seen 03/13/18 with persistent stress incontinence with most of his leakage being in the morning associated with drinking coffee - using 1ppd. His IPSS was 1 and he was \"unhappy.\" He was therefore asked to cut back on caffeine.  Anticholinergics were also discussed but patient wasn't interested. KRISTY was 1, but his erectile function was not a priority.      Today he presents in routine followup 1 year out from surgery. His PSA on 6/7/18 was <0.1ng/dL (see outside records). Feels in the last month that his incontinence has improved and he is pleased with this, but still using 1ppd. Will leak with coughing, sneezing.  His control seems to be doing better at [employer] - he previously leaked with standing, sitting, kneeling. He has cut down on coffee - drinks 1 cup per day now - and he thinks maybe this helped. No dysuria, hematuria.     Sexual activity none- not interested in therapies for this.   No changes to health.  He just had annual physical.  It was recommended that he take vitamin D because his was a little low, but otherwise he was thought to be fit.   Incisions no problems      Review of PSA  06/07/18 <0.01ng/dL  01/05/18 <0.01ng/dL  10/9/17 <0.1ng/dL  7/24/17 <0.1ng/dL    Current Outpatient Prescriptions   Medication Sig Dispense Refill     acetaminophen (TYLENOL) 325 MG tablet Take 2 tablets (650 mg) by mouth every 4 hours as needed for mild pain 100 tablet 0     ASPIRIN PO Take 81 mg by mouth every morning        lisinopril-hydrochlorothiazide (PRINZIDE/ZESTORETIC) 10-12.5 MG per tablet Take 1 tablet by " mouth every morning        SIMVASTATIN PO Take 20 mg by mouth At Bedtime         ALLERGIES: Review of patient's allergies indicates no known allergies.      REVIEW OF SYSTEMS:  As above in HPI    GENERAL PHYSICAL EXAM:   Vitals: /87  Pulse 85  Ht 1.829 m (6')  Wt 98.9 kg (218 lb)  BMI 29.57 kg/m2  Body mass index is 29.57 kg/(m^2).    GENERAL: Well groomed, well developed, well nourished male in NAD.  NEURO: Alert and oriented x 3.  PSYCH: Normal mood and affect, pleasant and agreeable during interview and exam.    LABS: The last test results for Mr. Tunde Albarado were reviewed:  PSA - No results found for: PSA  BMP -   Recent Labs   Lab Test  06/03/17 0803 06/02/17 2112 05/24/17   1728   NA  140  140  140   POTASSIUM  4.4  4.6  4.4   CHLORIDE  108  107  105   CO2  25  25  29   BUN  21  20  20   CR  1.25  1.31*  1.14   GLC  122*  212*  123*   ISABELA  8.2*  8.4*  9.7       CBC -   Recent Labs   Lab Test  06/03/17 0803 06/02/17 2112 05/24/17   1728   WBC  11.1*  12.0*  9.1   HGB  10.7*  11.6*  15.5   PLT  147*  179  209       ASSESSMENT:   1) Prostate cancer  2) Urinary incontinence - improving  3) Erectile dysfunction    PLAN:   - Encourage to exercise - continue walking 3 miles per day  - Continue pelvic floor exercises at home.   - Continue limiting coffee  - Return 3 months with a PSA recheck    Bobbi Au PA-C  Department of Urologic Surgery

## 2018-06-12 NOTE — PATIENT INSTRUCTIONS
- Encourage to exercise - continue walking 3 miles per day  - Continue pelvic floor exercises at home.   - Limiting coffee  - Return 3 months with a PSA recheck    Bobbi Au PA-C

## 2018-10-10 ENCOUNTER — TRANSFERRED RECORDS (OUTPATIENT)
Dept: HEALTH INFORMATION MANAGEMENT | Facility: CLINIC | Age: 68
End: 2018-10-10

## 2018-10-16 ENCOUNTER — PATIENT OUTREACH (OUTPATIENT)
Dept: CARE COORDINATION | Facility: CLINIC | Age: 68
End: 2018-10-16

## 2018-10-17 ENCOUNTER — OFFICE VISIT (OUTPATIENT)
Dept: UROLOGY | Facility: CLINIC | Age: 68
End: 2018-10-17
Payer: COMMERCIAL

## 2018-10-17 VITALS
HEART RATE: 85 BPM | WEIGHT: 221 LBS | BODY MASS INDEX: 29.93 KG/M2 | SYSTOLIC BLOOD PRESSURE: 145 MMHG | HEIGHT: 72 IN | DIASTOLIC BLOOD PRESSURE: 88 MMHG

## 2018-10-17 DIAGNOSIS — C61 MALIGNANT NEOPLASM OF PROSTATE (H): Primary | ICD-10-CM

## 2018-10-17 RX ORDER — AMLODIPINE BESYLATE 5 MG/1
5 TABLET ORAL DAILY
Refills: 3 | COMMUNITY
Start: 2018-10-10

## 2018-10-17 ASSESSMENT — PAIN SCALES - GENERAL: PAINLEVEL: NO PAIN (0)

## 2018-10-17 NOTE — PATIENT INSTRUCTIONS
- PSA and office visit in four months  - Could consider pelvic floor physical therapy at that time.  For now, do your home exercises    Bobbi Au

## 2018-10-17 NOTE — PROGRESS NOTES
"HPI: Mr. Tunde Albarado is a 68 year old year old male presenting today for evaluation of chief complaint(s): RECHECK ( 3 month f/u )    Mr. Albarado has PMH significant for HTN, HLD and elevated PSA (6.2ng/dL) then prostate cancer s/p RALP with [facility] (Dr. Peace) on 6/2/17.  Path showed: [place] 7 (3+4) adenoca, pT2N0 with no EPE, SVI, LVI, PNI, and the margins were negative.     He was last seen 6/12/18 with a PSA of 0.01ng/dL.  At that point his incontinence was finally improving although he was still using 1ppd and was leaking with coughing, sneezing.  He had cut down on coffee and this seemed to help urinary symptoms.  He had no sexual function but was not interested in interventions.     Today he returns in routine followup.  His PSA remains undetectable.   Still wearing 1ppd - fairly wet.  No improvement from his last visit in June.  He is dry with sleeping but has most of his leakage between 11-1 when he takes his daily walk, and then again around supper time.  He still drinks 1 cup of coffee in the early am then another after dinner, but this is considerably less than he previously consumed.  He doesn't feel the coffee is worsening his incontinence.    No urgency.   He did do PFPT in the beginning.  He found it somewhat helpful.  He still does some of the exercises daily.  Others he never does because they never made sense.   AUA SS: 1 \" unhappy\"  KRISTY:  0, and not interested in interventions.  Wishes to improve urinary control before working on sexual fxn.  Is a .      Review of PSA  10/10/18 - <0.01ng/dL  06/07/18 <0.01ng/dL  01/05/18 <0.01ng/dL  10/9/17 <0.1ng/dL  7/24/17 <0.1ng/dL    ROS:  Has been very healthy the last few months.    No illnesses, new medications, new surgeries.     Current Outpatient Prescriptions   Medication Sig Dispense Refill     acetaminophen (TYLENOL) 325 MG tablet Take 2 tablets (650 mg) by mouth every 4 hours as needed for mild pain 100 tablet 0     amLODIPine (NORVASC) 5 MG " tablet Take 5 mg by mouth daily  3     ASPIRIN PO Take 81 mg by mouth every morning        lisinopril-hydrochlorothiazide (PRINZIDE/ZESTORETIC) 10-12.5 MG per tablet Take 1 tablet by mouth every morning        SIMVASTATIN PO Take 20 mg by mouth At Bedtime         ALLERGIES: Review of patient's allergies indicates no known allergies.      REVIEW OF SYSTEMS:  As above in HPI    GENERAL PHYSICAL EXAM:   Vitals: /88  Pulse 85  Ht 1.829 m (6')  Wt 100.2 kg (221 lb)  BMI 29.97 kg/m2  Body mass index is 29.97 kg/(m^2).    GENERAL: Well groomed, well developed, well nourished male in NAD.  GI: Soft, NT, ND, no palpable masses.  Incisions well healed - nontender without hernia  MS: Full ROM in extremities, gait normal, normal muscle tone  SKIN: Warm to touch, dry.  + brown raised scaled plaques across abd (consistent with SKs)   HEMATOLOGIC/LYMPHATIC/IMMUNOLOGIC: No LE edema.  NEURO: Alert and oriented x 3.  PSYCH: Normal mood and affect, pleasant and agreeable during interview and exam.    LABS: The last test results for Mr. Tunde Albarado were reviewed:  PSA - No results found for: PSA  BMP -   Recent Labs   Lab Test  06/03/17   0803  06/02/17 2112 05/24/17   1728   NA  140  140  140   POTASSIUM  4.4  4.6  4.4   CHLORIDE  108  107  105   CO2  25  25  29   BUN  21  20  20   CR  1.25  1.31*  1.14   GLC  122*  212*  123*   ISABELA  8.2*  8.4*  9.7       CBC -   Recent Labs   Lab Test  06/03/17   0803  06/02/17 2112 05/24/17   1728   WBC  11.1*  12.0*  9.1   HGB  10.7*  11.6*  15.5   PLT  147*  179  209       ASSESSMENT:   1) Prostate Cancer  2) urinary incontinence  3) Erectile dysfunction    PLAN:   - No urgency so an OAB medication would not likely be helpful  - Recommend that he re-visit PFPT but he wants to hold off for now.  At his next visit, in four months, he will consider PFPT again if he is still having incontinence.   - Return four months with a PSA.  After that visits will be less frequent (every 6  months)     Bobbi Au PA-C  Department of Urologic Surgery

## 2018-10-17 NOTE — MR AVS SNAPSHOT
After Visit Summary   10/17/2018    Tunde Albarado    MRN: [id number]           Patient Information     Date Of Birth          1950        Visit Information        Provider Department      10/17/2018 12:00 PM Fifi Au PA [facility] Urology and [facility] for Prostate and Urologic Cancers        Care Instructions    - PSA and office visit in four months  - Could consider pelvic floor physical therapy at that time.  For now, do your home exercises    Bobbi Au          Follow-ups after your visit        Who to contact     Please call your clinic at [phone] to:    Ask questions about your health    Make or cancel appointments    Discuss your medicines    Learn about your test results    Speak to your doctor            Additional Information About Your Visit        MyChart Information     [employer] is an electronic gateway that provides easy, online access to your medical records. With [employer], you can request a clinic appointment, read your test results, renew a prescription or communicate with your care team.     To sign up for [email or web address] visit the website at [email or web address]   You will be asked to enter the access code listed below, as well as some personal information. Please follow the directions to create your username and password.     Your access code is: [email or web address]  Expires: 2018  6:31 AM     Your access code will  in 90 days. If you need help or a new code, please contact your [facility] or call [phone] for assistance.        Care EveryWhere ID     This is your Care EveryWhere ID. This could be used by other organizations to access your [place] medical records  [id number]        Your Vitals Were     Pulse Height BMI (Body Mass Index)             85 1.829 m (6') 29.97 kg/m2          Blood Pressure from Last 3 Encounters:   10/17/18 145/88   18 134/87   18 145/90    Weight from Last 3 Encounters:   10/17/18 100.2  kg (221 lb)   06/12/18 98.9 kg (218 lb)   03/13/18 100.8 kg (222 lb 4.8 oz)              Today, you had the following     No orders found for display       Primary Care Provider Office Phone # Fax #    Mayur Hinds [phone]       [facility] [address] [facility] [address]        Equal Access to Services     ELMIRA MCCOY : Hadii aad ku hadasho Soomaali, waaxda luqadaha, qaybta kaalmada adeegyada, waxay idiin hayaan adeeg kharash la'aan ah. So [facility] [phone].    ATENCIÓN: Si habla español, tiene a fletcher disposición servicios gratuitos de asistencia lingüística. Ektaame al [phone].    We comply with applicable federal civil rights laws and [place] laws. We do not discriminate on the basis of race, color, national origin, age, disability, sex, sexual orientation, or gender identity.            Thank you!     Thank you for choosing [facility] UROLOGY AND [facility] FOR PROSTATE AND UROLOGIC CANCERS  for your care. Our goal is always to provide you with excellent care. Hearing back from our patients is one way we can continue to improve our services. Please take a few minutes to complete the written survey that you may receive in the mail after your visit with us. Thank you!             Your Updated Medication List - Protect others around you: Learn how to safely use, store and throw away your medicines at www.disposemymeds.org.          This list is accurate as of 10/17/18 12:29 PM.  Always use your most recent med list.                   Brand Name Dispense Instructions for use Diagnosis    acetaminophen 325 MG tablet    TYLENOL    100 tablet    Take 2 tablets (650 mg) by mouth every 4 hours as needed for mild pain    Malignant neoplasm of prostate (H)       amLODIPine 5 MG tablet    NORVASC     Take 5 mg by mouth daily        ASPIRIN PO      Take 81 mg by mouth every morning        lisinopril-hydrochlorothiazide 10-12.5 MG per tablet    PRINZIDE/ZESTORETIC     Take 1 tablet by mouth every morning         SIMVASTATIN PO      Take 20 mg by mouth At Bedtime

## 2019-02-08 ENCOUNTER — PRE VISIT (OUTPATIENT)
Dept: UROLOGY | Facility: CLINIC | Age: 69
End: 2019-02-08

## 2019-02-08 NOTE — TELEPHONE ENCOUNTER
Reason for visit: prostate cancer follow up     Relevant information: history of prostatectomy    Records/imaging/labs/orders: pt having PSA drawn at his home clinic    Pt called: yes    At Rooming: regular

## 2019-02-15 ENCOUNTER — TRANSFERRED RECORDS (OUTPATIENT)
Dept: HEALTH INFORMATION MANAGEMENT | Facility: CLINIC | Age: 69
End: 2019-02-15

## 2019-04-26 ENCOUNTER — TRANSFERRED RECORDS (OUTPATIENT)
Dept: HEALTH INFORMATION MANAGEMENT | Facility: CLINIC | Age: 69
End: 2019-04-26

## 2019-05-01 ENCOUNTER — OFFICE VISIT (OUTPATIENT)
Dept: UROLOGY | Facility: CLINIC | Age: 69
End: 2019-05-01
Payer: COMMERCIAL

## 2019-05-01 VITALS
DIASTOLIC BLOOD PRESSURE: 85 MMHG | SYSTOLIC BLOOD PRESSURE: 149 MMHG | HEIGHT: 72 IN | BODY MASS INDEX: 31.69 KG/M2 | HEART RATE: 78 BPM | WEIGHT: 234 LBS

## 2019-05-01 DIAGNOSIS — N39.46 MIXED STRESS AND URGE URINARY INCONTINENCE: Primary | ICD-10-CM

## 2019-05-01 DIAGNOSIS — C61 PROSTATE CANCER (H): ICD-10-CM

## 2019-05-01 RX ORDER — OXYBUTYNIN CHLORIDE 5 MG/1
5 TABLET, EXTENDED RELEASE ORAL DAILY
Qty: 30 TABLET | Refills: 5 | Status: SHIPPED | OUTPATIENT
Start: 2019-05-01 | End: 2019-10-01

## 2019-05-01 ASSESSMENT — MIFFLIN-ST. JEOR: SCORE: 1854.42

## 2019-05-01 ASSESSMENT — PAIN SCALES - GENERAL: PAINLEVEL: NO PAIN (0)

## 2019-05-01 NOTE — PROGRESS NOTES
Return- 4 Mo  Hx of Prostate Cancer-Prostatectomy   PSA was <0.1 on 19    He reports leaking mostly around noon and dinner time.  Pt denies other urinary sx and pain.      Chief Complaint   Patient presents with     RECHECK     Elevated PSA/Prostate Cancer       Blood pressure 149/85, pulse 78, height 1.829 m (6'), weight 106.1 kg (234 lb). Body mass index is 31.74 kg/m .    Patient Active Problem List   Diagnosis     Malignant neoplasm of prostate (H)     Prostate cancer (H)       No Known Allergies    Current Outpatient Medications   Medication Sig Dispense Refill     acetaminophen (TYLENOL) 325 MG tablet Take 2 tablets (650 mg) by mouth every 4 hours as needed for mild pain 100 tablet 0     amLODIPine (NORVASC) 5 MG tablet Take 5 mg by mouth daily  3     ASPIRIN PO Take 81 mg by mouth every morning        lisinopril-hydrochlorothiazide (PRINZIDE/ZESTORETIC) 10-12.5 MG per tablet Take 1 tablet by mouth every morning        SIMVASTATIN PO Take 20 mg by mouth At Bedtime         Social History     Tobacco Use     Smoking status: Former Smoker     Packs/day: 0.50     Years: 32.00     Pack years: 16.00     Types: Cigarettes     Start date:      Last attempt to quit: 2000     Years since quittin.3     Smokeless tobacco: Never Used   Substance Use Topics     Alcohol use: Yes     Comment: Social     Drug use: No       Michael Lopez, EMT  2019  12:36 PM

## 2019-05-01 NOTE — Clinical Note
2019       RE: Tunde Albarado  [address]  [facility] [address]     Dear Colleague,    Thank you for referring your patient, Tunde Albarado, to the [facility] UROLOGY AND INST FOR PROSTATE AND UROLOGIC CANCERS at [facility]. Please see a copy of my visit note below.    Return- 4 Mo  Hx of Prostate Cancer-Prostatectomy   PSA was <0.1 on 19    He reports leaking mostly around noon and dinner time.  Pt denies other urinary sx and pain.      Chief Complaint   Patient presents with     RECHECK     Elevated PSA/Prostate Cancer       Blood pressure 149/85, pulse 78, height 1.829 m (6'), weight 106.1 kg (234 lb). Body mass index is 31.74 kg/m .    Patient Active Problem List   Diagnosis     Malignant neoplasm of prostate (H)     Prostate cancer (H)       No Known Allergies    Current Outpatient Medications   Medication Sig Dispense Refill     acetaminophen (TYLENOL) 325 MG tablet Take 2 tablets (650 mg) by mouth every 4 hours as needed for mild pain 100 tablet 0     amLODIPine (NORVASC) 5 MG tablet Take 5 mg by mouth daily  3     ASPIRIN PO Take 81 mg by mouth every morning        lisinopril-hydrochlorothiazide (PRINZIDE/ZESTORETIC) 10-12.5 MG per tablet Take 1 tablet by mouth every morning        SIMVASTATIN PO Take 20 mg by mouth At Bedtime         Social History     Tobacco Use     Smoking status: Former Smoker     Packs/day: 0.50     Years: 32.00     Pack years: 16.00     Types: Cigarettes     Start date:      Last attempt to quit: 2000     Years since quittin.3     Smokeless tobacco: Never Used   Substance Use Topics     Alcohol use: Yes     Comment: Social     Drug use: No       Michael Lopez, EMT  2019  12:36 PM        HPI: Mr. Tunde Albarado is a 69 year old year old male presenting today for evaluation of chief complaint(s): RECHECK (Elevated PSA/Prostate Cancer)    Mr. Albarado has PMH significant for HTN, HLD and elevated PSA (6.2ng/dL) then prostate cancer  "s/p RALP with [facility] (Dr. Peace) on 6/2/17.  Path showed: [place] 7 (3+4) adenoca, pT2N0 with no EPE, SVI, LVI, PNI, and the margins were negative.     He was last seen  10/17/18 with an undetectable PSA.  At that time he was still using 1ppd that was fairly wet, worse in the evening or with taking walks. He was having no urgency.  He was dry with sleeping.  His AUA SS: 1\"unhappy\".  His alphonso was zero and he was not interested in resuming sexual fxn.     - Today he returns in routine followup. PSA 4/26/19 continues to be undetectable  - Incontinence is maybe just a little better.  Still 1ppd.  Can be fairly wet. Incontinence seems to increase around noon and supper.  He doesn't know why.  Doesn't drink much.  Doesn't increase activity level around these times.   - Coffee 1 cup in the morning.   Hasn't exercised at all this winter.  Wants to get back to it. Doesn't like walking on treadmills. Usually wants to walk outside 3-5 miles per day. Curiously doesn't leak much when he is walking. Does leak with getting up from the kitchen table and walking to the sink although he denies urgency.   - With voiding he feels he empties.   - No constipation.   - Wants to talk about ED once he gets the leakage under control  - Still doing PFPT exercises.      Review of PSA  4/26/19 - PSA <0.01ng/dL  10/10/18 - <0.01ng/dL  06/07/18 <0.01ng/dL  01/05/18 <0.01ng/dL  10/9/17 <0.1ng/dL  7/24/17 <0.1ng/dL    Current Outpatient Medications   Medication Sig Dispense Refill     acetaminophen (TYLENOL) 325 MG tablet Take 2 tablets (650 mg) by mouth every 4 hours as needed for mild pain 100 tablet 0     amLODIPine (NORVASC) 5 MG tablet Take 5 mg by mouth daily  3     ASPIRIN PO Take 81 mg by mouth every morning        lisinopril-hydrochlorothiazide (PRINZIDE/ZESTORETIC) 10-12.5 MG per tablet Take 1 tablet by mouth every morning        SIMVASTATIN PO Take 20 mg by mouth At Bedtime         ALLERGIES: Patient has no known allergies.      REVIEW " OF SYSTEMS:  As above in HPI    GENERAL PHYSICAL EXAM:   Vitals: /85   Pulse 78   Ht 1.829 m (6')   Wt 106.1 kg (234 lb)   BMI 31.74 kg/m     Body mass index is 31.74 kg/m .    GENERAL: Well groomed, well developed, well nourished male in NAD.  NEURO: Alert and oriented x 3.  PSYCH: Normal mood and affect, pleasant and agreeable during interview and exam.    LABS: The last test results for Mr. Tunde Albarado were reviewed:  PSA - No results found for: PSA  BMP -   Recent Labs   Lab Test 06/03/17  0803 06/02/17 2112 05/24/17  1728    140 140   POTASSIUM 4.4 4.6 4.4   CHLORIDE 108 107 105   CO2 25 25 29   BUN 21 20 20   CR 1.25 1.31* 1.14   * 212* 123*   ISABELA 8.2* 8.4* 9.7       CBC -   Recent Labs   Lab Test 06/03/17 0803 06/02/17 2112 05/24/17  1728   WBC 11.1* 12.0* 9.1   HGB 10.7* 11.6* 15.5   * 179 209       ASSESSMENT:   1) post-prostatectomy incontinence with component of urgency?  2) ED  3) Prostate cancer    PLAN:   - oxybutynin ER 5mg - start once daily.  Give it at least 2 weeks.  Some people notice mild dry mouth or constipation.  If it gets bad, stop the drug and let us know.  If this is somewhat helpful but you wish things were better, then let us know and we will increase the dose.  If after two weeks you are noticing absolutely no benefit, then it is ok to stop the medication if you wish.   - 6 months with a PSA  - could consider seeing PFPT again too if symptoms don't improve.     VIRGINIA Landa-FELIX  Department of Urologic Surgery      Again, thank you for allowing me to participate in the care of your patient.      Sincerely,    VIRGINIA Hammond

## 2019-05-01 NOTE — PROGRESS NOTES
"HPI: Mr. Tunde Albarado is a 69 year old year old male presenting today for evaluation of chief complaint(s): RECHECK (Elevated PSA/Prostate Cancer)    Mr. Albarado has PMH significant for HTN, HLD and elevated PSA (6.2ng/dL) then prostate cancer s/p RALP with [facility] (Dr. Peace) on 6/2/17.  Path showed: [place] 7 (3+4) adenoca, pT2N0 with no EPE, SVI, LVI, PNI, and the margins were negative.     He was last seen 10/17/18 with an undetectable PSA.  At that time he was still using 1ppd that was fairly wet, worse in the evening or with taking walks. He was having no urgency.  He was dry with sleeping.  His AUA SS: 1\"unhappy\".  His kristy was zero and he was not interested in resuming sexual fxn.     - Today he returns in routine followup. PSA 4/26/19 continues to be undetectable  - Incontinence is maybe just a little better.  Still 1ppd.  Can be fairly wet. Incontinence seems to increase around noon and supper.  He doesn't know why.  Doesn't drink much.  Doesn't increase activity level around these times.   - Coffee 1 cup in the morning.   Hasn't exercised at all this winter.  Wants to get back to it. Doesn't like walking on treadmills. Usually wants to walk outside 3-5 miles per day. Curiously doesn't leak much when he is walking. Does leak with getting up from the kitchen table and walking to the sink although he denies urgency.   - With voiding he feels he empties.   - No constipation.   - Wants to talk about ED once he gets the leakage under control  - Still doing PFPT exercises.   - KRISTY 1  - IPSS 7 \"unhappy\"     Review of PSA  4/26/19 - PSA <0.01ng/dL  10/10/18 - <0.01ng/dL  06/07/18 <0.01ng/dL  01/05/18 <0.01ng/dL  10/9/17 <0.1ng/dL  7/24/17 <0.1ng/dL    Current Outpatient Medications   Medication Sig Dispense Refill     acetaminophen (TYLENOL) 325 MG tablet Take 2 tablets (650 mg) by mouth every 4 hours as needed for mild pain 100 tablet 0     amLODIPine (NORVASC) 5 MG tablet Take 5 mg by mouth daily  3     ASPIRIN PO " Take 81 mg by mouth every morning        lisinopril-hydrochlorothiazide (PRINZIDE/ZESTORETIC) 10-12.5 MG per tablet Take 1 tablet by mouth every morning        SIMVASTATIN PO Take 20 mg by mouth At Bedtime         ALLERGIES: Patient has no known allergies.      REVIEW OF SYSTEMS:  As above in HPI    GENERAL PHYSICAL EXAM:   Vitals: /85   Pulse 78   Ht 1.829 m (6')   Wt 106.1 kg (234 lb)   BMI 31.74 kg/m    Body mass index is 31.74 kg/m .    GENERAL: Well groomed, well developed, well nourished male in NAD.  NEURO: Alert and oriented x 3.  PSYCH: Normal mood and affect, pleasant and agreeable during interview and exam.    LABS: The last test results for Mr. Tunde Albarado were reviewed:  PSA - No results found for: PSA  BMP -   Recent Labs   Lab Test 06/03/17  0803 06/02/17 2112 05/24/17  1728    140 140   POTASSIUM 4.4 4.6 4.4   CHLORIDE 108 107 105   CO2 25 25 29   BUN 21 20 20   CR 1.25 1.31* 1.14   * 212* 123*   ISABELA 8.2* 8.4* 9.7       CBC -   Recent Labs   Lab Test 06/03/17  0803 06/02/17 2112 05/24/17  1728   WBC 11.1* 12.0* 9.1   HGB 10.7* 11.6* 15.5   * 179 209       ASSESSMENT:   1) post-prostatectomy incontinence with component of urgency?  2) ED  3) Prostate cancer    PLAN:   - oxybutynin ER 5mg - start once daily.  Give it at least 2 weeks.  Some people notice mild dry mouth or constipation.  If it gets bad, stop the drug and let us know.  If this is somewhat helpful but you wish things were better, then let us know and we will increase the dose.  If after two weeks you are noticing absolutely no benefit, then it is ok to stop the medication if you wish.   - 6 months with a PSA  - could consider seeing PFPT again too if symptoms don't improve.     Bobbi Au PA-C  Department of Urologic Surgery

## 2019-05-01 NOTE — PATIENT INSTRUCTIONS
- oxybutynin ER 5mg - start once daily.  Give it at least 2 weeks.  Some people notice mild dry mouth or constipation.  If it gets bad, stop the drug and let us know.  If this is somewhat helpful but you wish things were better, then let us know and we will increase the dose.  If after two weeks you are noticing absolutely no benefit, then it is ok to stop the medication if you wish.   - 6 months with a PSA  - could consider seeing pelvic floor physical therapy again too if symptoms don't improve.     VIRGINIA Landa [facility]

## 2019-07-04 ENCOUNTER — HOSPITAL ENCOUNTER (EMERGENCY)
Facility: OTHER | Age: 69
Discharge: HOME | End: 2019-07-04
Attending: FAMILY MEDICINE | Admitting: FAMILY MEDICINE
Payer: MEDICARE

## 2019-07-04 VITALS
SYSTOLIC BLOOD PRESSURE: 148 MMHG | WEIGHT: 232 LBS | HEART RATE: 76 BPM | TEMPERATURE: 97 F | RESPIRATION RATE: 24 BRPM | OXYGEN SATURATION: 94 % | DIASTOLIC BLOOD PRESSURE: 69 MMHG | BODY MASS INDEX: 31.46 KG/M2

## 2019-07-04 DIAGNOSIS — R42 DIZZINESS: ICD-10-CM

## 2019-07-04 DIAGNOSIS — E86.0 DEHYDRATION: ICD-10-CM

## 2019-07-04 DIAGNOSIS — E11.8 TYPE 2 DIABETES MELLITUS WITH COMPLICATION, UNSPECIFIED WHETHER LONG TERM INSULIN USE: ICD-10-CM

## 2019-07-04 LAB
ALBUMIN UR-MCNC: NEGATIVE MG/DL
ANION GAP SERPL CALCULATED.3IONS-SCNC: 8 MMOL/L (ref 3–14)
APPEARANCE UR: CLEAR
BASOPHILS # BLD AUTO: 0.1 10E9/L (ref 0–0.2)
BASOPHILS NFR BLD AUTO: 0.3 %
BILIRUB UR QL STRIP: NEGATIVE
BUN SERPL-MCNC: 32 MG/DL (ref 7–25)
CALCIUM SERPL-MCNC: 10.2 MG/DL (ref 8.6–10.3)
CHLORIDE SERPL-SCNC: 99 MMOL/L (ref 98–107)
CO2 SERPL-SCNC: 28 MMOL/L (ref 21–31)
COLOR UR AUTO: YELLOW
CREAT SERPL-MCNC: 1.41 MG/DL (ref 0.7–1.3)
CRP SERPL-MCNC: 0.5 MG/L
DIFFERENTIAL METHOD BLD: ABNORMAL
EOSINOPHIL # BLD AUTO: 0.1 10E9/L (ref 0–0.7)
EOSINOPHIL NFR BLD AUTO: 0.8 %
ERYTHROCYTE [DISTWIDTH] IN BLOOD BY AUTOMATED COUNT: 12.8 % (ref 10–15)
GFR SERPL CREATININE-BSD FRML MDRD: 50 ML/MIN/{1.73_M2}
GLUCOSE SERPL-MCNC: 235 MG/DL (ref 70–105)
GLUCOSE UR STRIP-MCNC: 250 MG/DL
HBA1C MFR BLD: 8.3 % (ref 4–6)
HCT VFR BLD AUTO: 44.7 % (ref 40–53)
HGB BLD-MCNC: 14.9 G/DL (ref 13.3–17.7)
HGB UR QL STRIP: NEGATIVE
IMM GRANULOCYTES # BLD: 0.1 10E9/L (ref 0–0.4)
IMM GRANULOCYTES NFR BLD: 0.5 %
KETONES UR STRIP-MCNC: NEGATIVE MG/DL
LEUKOCYTE ESTERASE UR QL STRIP: NEGATIVE
LYMPHOCYTES # BLD AUTO: 1.1 10E9/L (ref 0.8–5.3)
LYMPHOCYTES NFR BLD AUTO: 7.5 %
MAGNESIUM SERPL-MCNC: 2.2 MG/DL (ref 1.9–2.7)
MCH RBC QN AUTO: 29.4 PG (ref 26.5–33)
MCHC RBC AUTO-ENTMCNC: 33.3 G/DL (ref 31.5–36.5)
MCV RBC AUTO: 88 FL (ref 78–100)
MONOCYTES # BLD AUTO: 0.8 10E9/L (ref 0–1.3)
MONOCYTES NFR BLD AUTO: 5.2 %
NEUTROPHILS # BLD AUTO: 12.4 10E9/L (ref 1.6–8.3)
NEUTROPHILS NFR BLD AUTO: 85.7 %
NITRATE UR QL: NEGATIVE
PH UR STRIP: 5.5 PH (ref 5–9)
PLATELET # BLD AUTO: 211 10E9/L (ref 150–450)
POTASSIUM SERPL-SCNC: 4.3 MMOL/L (ref 3.5–5.1)
RBC # BLD AUTO: 5.07 10E12/L (ref 4.4–5.9)
RBC #/AREA URNS AUTO: NORMAL /HPF
SODIUM SERPL-SCNC: 135 MMOL/L (ref 134–144)
SOURCE: ABNORMAL
SP GR UR STRIP: 1.01 (ref 1–1.03)
TROPONIN I SERPL-MCNC: <0.03 UG/L (ref 0–0.03)
TSH SERPL DL<=0.05 MIU/L-ACNC: 2.63 IU/ML (ref 0.34–5.6)
UROBILINOGEN UR STRIP-ACNC: 0.2 EU/DL (ref 0.2–1)
WBC # BLD AUTO: 14.5 10E9/L (ref 4–11)
WBC #/AREA URNS AUTO: NORMAL /HPF

## 2019-07-04 PROCEDURE — 84484 ASSAY OF TROPONIN QUANT: CPT | Performed by: FAMILY MEDICINE

## 2019-07-04 PROCEDURE — 93005 ELECTROCARDIOGRAM TRACING: CPT | Performed by: FAMILY MEDICINE

## 2019-07-04 PROCEDURE — 25000128 H RX IP 250 OP 636: Performed by: FAMILY MEDICINE

## 2019-07-04 PROCEDURE — 96361 HYDRATE IV INFUSION ADD-ON: CPT | Performed by: FAMILY MEDICINE

## 2019-07-04 PROCEDURE — 99283 EMERGENCY DEPT VISIT LOW MDM: CPT | Mod: Z6 | Performed by: FAMILY MEDICINE

## 2019-07-04 PROCEDURE — 96374 THER/PROPH/DIAG INJ IV PUSH: CPT | Performed by: FAMILY MEDICINE

## 2019-07-04 PROCEDURE — 85025 COMPLETE CBC W/AUTO DIFF WBC: CPT | Performed by: FAMILY MEDICINE

## 2019-07-04 PROCEDURE — 83036 HEMOGLOBIN GLYCOSYLATED A1C: CPT | Performed by: FAMILY MEDICINE

## 2019-07-04 PROCEDURE — 84443 ASSAY THYROID STIM HORMONE: CPT | Performed by: FAMILY MEDICINE

## 2019-07-04 PROCEDURE — 81001 URINALYSIS AUTO W/SCOPE: CPT | Performed by: FAMILY MEDICINE

## 2019-07-04 PROCEDURE — 83735 ASSAY OF MAGNESIUM: CPT | Performed by: FAMILY MEDICINE

## 2019-07-04 PROCEDURE — 80048 BASIC METABOLIC PNL TOTAL CA: CPT | Performed by: FAMILY MEDICINE

## 2019-07-04 PROCEDURE — 93010 ELECTROCARDIOGRAM REPORT: CPT | Performed by: INTERNAL MEDICINE

## 2019-07-04 PROCEDURE — 86140 C-REACTIVE PROTEIN: CPT | Performed by: FAMILY MEDICINE

## 2019-07-04 PROCEDURE — 99284 EMERGENCY DEPT VISIT MOD MDM: CPT | Mod: 25 | Performed by: FAMILY MEDICINE

## 2019-07-04 RX ORDER — SODIUM CHLORIDE 9 MG/ML
1000 INJECTION, SOLUTION INTRAVENOUS CONTINUOUS
Status: DISCONTINUED | OUTPATIENT
Start: 2019-07-04 | End: 2019-07-04 | Stop reason: HOSPADM

## 2019-07-04 RX ORDER — SIMVASTATIN 20 MG
TABLET ORAL
Refills: 3 | COMMUNITY
Start: 2019-04-04 | End: 2022-01-07

## 2019-07-04 RX ADMIN — SODIUM CHLORIDE 1000 ML: 9 INJECTION, SOLUTION INTRAVENOUS at 01:55

## 2019-07-04 RX ADMIN — PROCHLORPERAZINE EDISYLATE 5 MG: 5 INJECTION INTRAMUSCULAR; INTRAVENOUS at 01:55

## 2019-07-04 ASSESSMENT — ENCOUNTER SYMPTOMS
NAUSEA: 1
WEAKNESS: 0
DYSURIA: 0
LIGHT-HEADEDNESS: 1
FEVER: 0
MUSCULOSKELETAL NEGATIVE: 1
CHILLS: 0
ANAL BLEEDING: 0
STRIDOR: 0
HEMATOLOGIC/LYMPHATIC NEGATIVE: 1
SHORTNESS OF BREATH: 0
COUGH: 0
BLOOD IN STOOL: 0
HEADACHES: 1
DIARRHEA: 0
ABDOMINAL PAIN: 0
DIAPHORESIS: 1
VOMITING: 1
ABDOMINAL DISTENTION: 0
SPEECH DIFFICULTY: 0
FATIGUE: 0
WHEEZING: 0
APPETITE CHANGE: 0
TROUBLE SWALLOWING: 0
PALPITATIONS: 0
CHEST TIGHTNESS: 0
DIZZINESS: 1
DIFFICULTY URINATING: 0
CONSTIPATION: 0
PSYCHIATRIC NEGATIVE: 1
BACK PAIN: 0
SINUS PRESSURE: 0

## 2019-07-04 NOTE — ED TRIAGE NOTES
Pt arrives to the Ed via private car.  Pt states that he went to bed and after 20 minutes he rolled over and felt dizzy.  Pt states he got better but anytime he moved he got dizzy.  Pt reports throwing up 4-5 times since going to bed.  Pt reports feeling weak.

## 2019-07-04 NOTE — DISCHARGE INSTRUCTIONS
Drink plenty of fluids. Schedule an appointment with your primary care doctor to discuss your blood sugar results and further recommendations

## 2019-07-04 NOTE — ED PROVIDER NOTES
History     Chief Complaint   Patient presents with     Dizziness     Generalized Weakness     HPI  Tunde Albarado is a 69 year old male who presents with dizziness that started this evening after he went to bed. Patient states he had a slight headache. He took a couple aleve. He rolled over . And felt dizzy. After that each time he would move he would get dizzy . Then developed nausea and vomitted 4-5 times  . Felt like if he was going to stand up he may fall down.  Headache subsided.  Did not drink much water today and did spend quite a bit of time outside and it was hot out.   NO chest pain . NO irregular heart beat. NO recent cold symptoms . No recent allergy symptoms.  2 drinks today.     Allergies:  No Known Allergies    Problem List:    Patient Active Problem List    Diagnosis Date Noted     Prostate cancer (H) 2017     Priority: Medium     Malignant neoplasm of prostate (H) 2017     Priority: Medium        Past Medical History:    Past Medical History:   Diagnosis Date     HLD (hyperlipidemia)      Hypertension      PONV (postoperative nausea and vomiting)      Prostate cancer (H)        Past Surgical History:    Past Surgical History:   Procedure Laterality Date     COLONOSCOPY       DAVINCI PROSTATECTOMY, LYMPHADENECTOMY N/A 2017    Procedure: DAVINCI PROSTATECTOMY, LYMPHADENECTOMY;  DaVinci Assisted Radical Prostatectomy, Bilateral Pelvic Lymphadenectomy;  Surgeon: Fanny Peace MD;  Location: [facility] OR     HERNIA REPAIR, INGUINAL RT/LT      open       Family History:    Family History   Problem Relation Age of Onset     Prostate Cancer Brother      Pancreatic Cancer Father        Social History:  Marital Status:   [5]  Social History     Tobacco Use     Smoking status: Former Smoker     Packs/day: 0.50     Years: 32.00     Pack years: 16.00     Types: Cigarettes     Start date:      Last attempt to quit: 2000     Years since quittin.5     Smokeless tobacco: Never Used    Substance Use Topics     Alcohol use: Yes     Comment: Social     Drug use: No        Medications:      amLODIPine (NORVASC) 5 MG tablet   ASPIRIN PO   lisinopril-hydrochlorothiazide (PRINZIDE/ZESTORETIC) 10-12.5 MG per tablet   simvastatin (ZOCOR) 20 MG tablet   acetaminophen (TYLENOL) 325 MG tablet   oxybutynin ER (DITROPAN-XL) 5 MG 24 hr tablet         Review of Systems   Constitutional: Positive for diaphoresis. Negative for appetite change, chills, fatigue and fever.   HENT: Positive for tinnitus. Negative for congestion, sinus pressure and trouble swallowing.    Respiratory: Negative for cough, chest tightness, shortness of breath, wheezing and stridor.    Cardiovascular: Negative for chest pain, palpitations and leg swelling.   Gastrointestinal: Positive for nausea and vomiting. Negative for abdominal distention, abdominal pain, anal bleeding, blood in stool, constipation and diarrhea.   Genitourinary: Negative.  Negative for decreased urine volume, difficulty urinating, dysuria and urgency.   Musculoskeletal: Negative.  Negative for back pain.   Neurological: Positive for dizziness, light-headedness and headaches. Negative for speech difficulty and weakness.   Hematological: Negative.    Psychiatric/Behavioral: Negative.        Physical Exam   BP: 163/72  Heart Rate: 81  Temp: 97  F (36.1  C)  Resp: 16  Weight: 105.2 kg (232 lb)  SpO2: 93 %      Physical Exam   Constitutional: He is oriented to person, place, and time. He appears well-developed and well-nourished. No distress.   HENT:   Head: Normocephalic and atraumatic.   Right Ear: External ear normal.   Left Ear: External ear normal.   Mouth/Throat: Oropharynx is clear and moist.   Eyes: Pupils are equal, round, and reactive to light. EOM are normal.   Neck: Normal range of motion. Neck supple. No JVD present. No tracheal deviation present. No thyromegaly present.   Cardiovascular: Normal rate, regular rhythm, normal heart sounds and intact distal  pulses. Exam reveals no gallop and no friction rub.   No murmur heard.  Pulmonary/Chest: Effort normal and breath sounds normal. No stridor. No respiratory distress. He has no wheezes. He has no rales. He exhibits no tenderness.   Abdominal: Soft. Bowel sounds are normal. He exhibits no distension and no mass. There is no tenderness. There is no rebound and no guarding. No hernia.   Musculoskeletal: Normal range of motion.   Lymphadenopathy:     He has no cervical adenopathy.   Neurological: He is alert and oriented to person, place, and time. He displays normal reflexes. No cranial nerve deficit or sensory deficit. He exhibits normal muscle tone. Coordination normal.   Skin: Skin is warm and dry. He is not diaphoretic.   Psychiatric: He has a normal mood and affect.   Nursing note and vitals reviewed.      ED Course        Procedures          Patient presents for evaluation of dizzy episodes shortly after going to bed this evening. Patient triaged to exam room. Vital signs reviewed. Cardiac monitors placed EKG obtained EKG showing NSR with without any acute ST segment changes or arrythmia . Physical exam without any focal neurologic deficits . Peripheral IV inserted Labs and diagnostics ordered. Fluid bolus started as suspect element of dehydration . Labs consistent with dehydration with bump in BUN /CReatinine from baseline . Labs also suspicious for new diagnosis diabetes with blood sugar 253 and glucosuria . A1C ordered for confirmation also elevated at 8.3. Patient feeling significantly improved following fluid bolus. Patient up to bathroom without any dizzy symptoms. Discussed results of blood sugar and A1c with patient . Information provided on diabetic diet and patient recommended to follow up with his primary care provider to discuss test results and further recommendations. Patient discharged from ER feeling much improved        No results found for this or any previous visit (from the past 24  hour(s)).    Medications - No data to display    Assessments & Plan (with Medical Decision Making)     I have reviewed the nursing notes.    I have reviewed the findings, diagnosis, plan and need for follow up with the patient.         Medication List      There are no discharge medications for this visit.         Final diagnoses:   Dizziness   Dehydration   Type 2 diabetes mellitus with complication, unspecified whether long term insulin use (H)       7/4/2019   [facility] Moriah Tipton MD  07/05/19 0668

## 2019-07-04 NOTE — ED AVS SNAPSHOT
[facility]  [address]  [place] [address]  Phone:  [phone]  Fax:  [phone]                                    Tunde Albarado   MRN: [id number]    Department:  [facility]   Date of Visit:  7/4/2019           After Visit Summary Signature Page    I have received my discharge instructions, and my questions have been answered. I have discussed any challenges I see with this plan with the nurse or doctor.    ..........................................................................................................................................  Patient/Patient Representative Signature      ..........................................................................................................................................  Patient Representative Print Name and Relationship to Patient    ..................................................               ................................................  Date                                   Time    ..........................................................................................................................................  Reviewed by Signature/Title    ...................................................              ..............................................  Date                                               Time          22EPIC Rev 08/18

## 2019-09-12 ENCOUNTER — TRANSFERRED RECORDS (OUTPATIENT)
Dept: HEALTH INFORMATION MANAGEMENT | Facility: CLINIC | Age: 69
End: 2019-09-12

## 2019-09-25 ENCOUNTER — PRE VISIT (OUTPATIENT)
Dept: UROLOGY | Facility: CLINIC | Age: 69
End: 2019-09-25

## 2019-09-25 NOTE — TELEPHONE ENCOUNTER
Chief Complaint : PSA check     Records/Orders: PSA was done on 9/24 closer to home     Pt Contacted: yes called about PSA    At Rooming:normal

## 2019-10-01 ENCOUNTER — OFFICE VISIT (OUTPATIENT)
Dept: UROLOGY | Facility: CLINIC | Age: 69
End: 2019-10-01
Payer: COMMERCIAL

## 2019-10-01 VITALS
WEIGHT: 215 LBS | DIASTOLIC BLOOD PRESSURE: 76 MMHG | BODY MASS INDEX: 29.12 KG/M2 | HEIGHT: 72 IN | HEART RATE: 79 BPM | SYSTOLIC BLOOD PRESSURE: 137 MMHG

## 2019-10-01 DIAGNOSIS — C61 PROSTATE CANCER (H): Primary | ICD-10-CM

## 2019-10-01 DIAGNOSIS — N39.46 MIXED STRESS AND URGE URINARY INCONTINENCE: ICD-10-CM

## 2019-10-01 RX ORDER — OXYBUTYNIN CHLORIDE 10 MG/1
10 TABLET, EXTENDED RELEASE ORAL DAILY
Qty: 30 TABLET | Refills: 11 | Status: SHIPPED | OUTPATIENT
Start: 2019-10-01 | End: 2020-04-07

## 2019-10-01 ASSESSMENT — MIFFLIN-ST. JEOR: SCORE: 1768.23

## 2019-10-01 ASSESSMENT — PAIN SCALES - GENERAL: PAINLEVEL: NO PAIN (0)

## 2019-10-01 NOTE — LETTER
"10/1/2019       RE: Tunde Albarado  [address]  [place] [address]     Dear Colleague,    Thank you for referring your patient, Tunde Albarado, to the [facility] UROLOGY AND INST FOR PROSTATE AND UROLOGIC CANCERS at [facility]. Please see a copy of my visit note below.    HPI: Mr. Tunde Albarado is a 69 year old year old male presenting today for evaluation of chief complaint(s): RECHECK (PSA check )    Mr. Albarado has PMH significant for HTN, HLD and elevated PSA (6.2ng/dL) then prostate cancer s/p RALP with [facility] (Dr. Peace) on 6/2/17.  Path showed: [place] 7 (3+4) adenoca, pT2N0 with no EPE, SVI, LVI, PNI, and the margins were negative.      He was last seen 5/1/19 with an undetectable PSA. He was still using 1ppd, sometimes fairly wet, for incontinence that was occurring around noon and supper and when walking from the kitchen table to the sink, although he denied urgent.  He was dry with walking and with sleeping. His AUA SS: 7 \"unhappy\" and he was started on oxybutynin ER 5mg daily and was asked to consider PFPT. Regarding sexual fxn, his kristy was zero and he was not interested in resuming sexual fxn.     Today he returns in routine f/u - 27 months out from surgery.  His PSA remains undetectable (9/12/19 <0.1ng/dL).  Urine control continues to improve a little and he does think the oxybutynin is helping, and he is still taking it. No dry mouth or constipation. Still using pads, but not as many. Seems to be leaking less with [employer] and volleyball games (where he stands up and sits down a lot).    Still not interested in PFPT.  Already doing home pelvic floor exercises and isn't sure another professional session would provide more benefit.  But he might consider in 6 months if there is no further improvement. No dysuria or hematuria.  Strong stream  And he feels he empties.    AUA SS today: 5 \"unhappy (improved from \"7\" at his last visit)    KRISTY: 1 - did not attempt.  No " current potential partner. Not seeking therapies    ROS: He was recently seen in the ED on 7/4/19 for dizziness and was newly diagnosed with DM (A1C 8.3%).  Started on metformin.  THis is going fine.  He doesn't need to check home sugars.  Saw family Dr in September and labs were improved.  He is feeling well overall.  Trying to lose weight thus he is  walking more and has developed plantar fasciitis.  Using special shoes to resolve this issue.       Current Outpatient Medications   Medication Sig Dispense Refill     acetaminophen (TYLENOL) 325 MG tablet Take 2 tablets (650 mg) by mouth every 4 hours as needed for mild pain 100 tablet 0     amLODIPine (NORVASC) 5 MG tablet Take 5 mg by mouth daily  3     ASPIRIN PO Take 81 mg by mouth every morning        lisinopril-hydrochlorothiazide (PRINZIDE/ZESTORETIC) 10-12.5 MG per tablet Take 1 tablet by mouth every morning        oxybutynin ER (DITROPAN-XL) 5 MG 24 hr tablet Take 1 tablet (5 mg) by mouth daily 30 tablet 5     simvastatin (ZOCOR) 20 MG tablet TAKE ONE TABLET BY MOUTH ONCE DAILY AT BEDTIME  3       ALLERGIES: Patient has no known allergies.      REVIEW OF SYSTEMS:  As above in HPI    GENERAL PHYSICAL EXAM:   Vitals: /76   Pulse 79   Ht 1.829 m (6')   Wt 97.5 kg (215 lb)   BMI 29.16 kg/m     Body mass index is 29.16 kg/m .    GENERAL: Well groomed, well developed, well nourished male in NAD.  GI: Soft, NT, ND, no palpable masses.  Incisions well healed without hernia or tenderness  MS: Gait normal, normal muscle tone  SKIN: Warm to touch, dry.  No visible rashes or lesions on examined areas.  HEMATOLOGIC/LYMPHATIC/IMMUNOLOGIC:  No LE edema.  NEURO: Alert and oriented x 3.  PSYCH: Normal mood and affect, pleasant and agreeable during interview and exam.    LABS: The last test results for Mr. Tunde Albarado were reviewed:  PSA - No results found for: PSA  BMP -   Recent Labs   Lab Test 07/04/19  0147 06/03/17  0803 06/02/17  2112    140 140    POTASSIUM 4.3 4.4 4.6   CHLORIDE 99 108 107   CO2 28 25 25   BUN 32* 21 20   CR 1.41* 1.25 1.31*   * 122* 212*   ISABELA 10.2 8.2* 8.4*   MAG 2.2  --   --        CBC -   Recent Labs   Lab Test 07/04/19  0147 06/03/17  0803 06/02/17  2112   WBC 14.5* 11.1* 12.0*   HGB 14.9 10.7* 11.6*    147* 179       ASSESSMENT:   1) prostate cancer  2) mixed urinary incontinence  3) ED    PLAN:   - RX: Increase oxybutynin ER dose from 5mg daily to 10mg daily.  Should notice better urine control.  Watch for increased dry mouth or constipation  - Continue home pelvic floor exercises  - Return 6 months with a PSA and office visit (will get PSA locally with his PCP) .     VIRGINIA Landa-FELIX  Department of Urologic Surgery      Again, thank you for allowing me to participate in the care of your patient.      Sincerely,    VIRGINIA Hammond

## 2019-10-01 NOTE — PATIENT INSTRUCTIONS
- Increase oxybutynin ER dose from 5mg daily to 10mg daily.  Should notice better urine control.  Watch for increased dry mouth or constipation  - Continue home pelvic floor exercises  - Return 6 months with a PSA and office visit.     VIRGINIA Landa [facility]

## 2019-10-01 NOTE — PROGRESS NOTES
"HPI: Mr. Tunde Albarado is a 69 year old year old male presenting today for evaluation of chief complaint(s): RECHECK (PSA check )    Mr. Albarado has PMH significant for HTN, HLD and elevated PSA (6.2ng/dL) then prostate cancer s/p RALP with [facility] (Dr. Peace) on 6/2/17.  Path showed: [place] 7 (3+4) adenoca, pT2N0 with no EPE, SVI, LVI, PNI, and the margins were negative.      He was last seen 5/1/19 with an undetectable PSA. He was still using 1ppd, sometimes fairly wet, for incontinence that was occurring around noon and supper and when walking from the kitchen table to the sink, although he denied urgent.  He was dry with walking and with sleeping. His AUA SS: 7 \"unhappy\" and he was started on oxybutynin ER 5mg daily and was asked to consider PFPT. Regarding sexual fxn, his kristy was zero and he was not interested in resuming sexual fxn.     Today he returns in routine f/u - 27 months out from surgery.  His PSA remains undetectable (9/12/19 <0.1ng/dL).  Urine control continues to improve a little and he does think the oxybutynin is helping, and he is still taking it. No dry mouth or constipation. Still using pads, but not as many. Seems to be leaking less with [employer] and volleyball games (where he stands up and sits down a lot).    Still not interested in PFPT.  Already doing home pelvic floor exercises and isn't sure another professional session would provide more benefit.  But he might consider in 6 months if there is no further improvement. No dysuria or hematuria.  Strong stream  And he feels he empties.    AUA SS today: 5 \"unhappy (improved from \"7\" at his last visit)    KRISTY: 1 - did not attempt.  No current potential partner. Not seeking therapies    ROS: He was recently seen in the ED on 7/4/19 for dizziness and was newly diagnosed with DM (A1C 8.3%).  Started on metformin.  THis is going fine.  He doesn't need to check home sugars.  Saw family Dr in September and labs were improved.  He is feeling well " overall.  Trying to lose weight thus he is  walking more and has developed plantar fasciitis.  Using special shoes to resolve this issue.       Current Outpatient Medications   Medication Sig Dispense Refill     acetaminophen (TYLENOL) 325 MG tablet Take 2 tablets (650 mg) by mouth every 4 hours as needed for mild pain 100 tablet 0     amLODIPine (NORVASC) 5 MG tablet Take 5 mg by mouth daily  3     ASPIRIN PO Take 81 mg by mouth every morning        lisinopril-hydrochlorothiazide (PRINZIDE/ZESTORETIC) 10-12.5 MG per tablet Take 1 tablet by mouth every morning        oxybutynin ER (DITROPAN-XL) 5 MG 24 hr tablet Take 1 tablet (5 mg) by mouth daily 30 tablet 5     simvastatin (ZOCOR) 20 MG tablet TAKE ONE TABLET BY MOUTH ONCE DAILY AT BEDTIME  3       ALLERGIES: Patient has no known allergies.      REVIEW OF SYSTEMS:  As above in HPI    GENERAL PHYSICAL EXAM:   Vitals: /76   Pulse 79   Ht 1.829 m (6')   Wt 97.5 kg (215 lb)   BMI 29.16 kg/m    Body mass index is 29.16 kg/m .    GENERAL: Well groomed, well developed, well nourished male in NAD.  GI: Soft, NT, ND, no palpable masses.  Incisions well healed without hernia or tenderness  MS: Gait normal, normal muscle tone  SKIN: Warm to touch, dry.  No visible rashes or lesions on examined areas.  HEMATOLOGIC/LYMPHATIC/IMMUNOLOGIC:  No LE edema.  NEURO: Alert and oriented x 3.  PSYCH: Normal mood and affect, pleasant and agreeable during interview and exam.    LABS: The last test results for Mr. Tunde Albarado were reviewed:  PSA - No results found for: PSA  BMP -   Recent Labs   Lab Test 07/04/19 0147 06/03/17  0803 06/02/17 2112    140 140   POTASSIUM 4.3 4.4 4.6   CHLORIDE 99 108 107   CO2 28 25 25   BUN 32* 21 20   CR 1.41* 1.25 1.31*   * 122* 212*   ISABELA 10.2 8.2* 8.4*   MAG 2.2  --   --        CBC -   Recent Labs   Lab Test 07/04/19 0147 06/03/17  0803 06/02/17 2112   WBC 14.5* 11.1* 12.0*   HGB 14.9 10.7* 11.6*    147* 179        ASSESSMENT:   1) prostate cancer  2) mixed urinary incontinence  3) ED    PLAN:   - RX: Increase oxybutynin ER dose from 5mg daily to 10mg daily.  Should notice better urine control.  Watch for increased dry mouth or constipation  - Continue home pelvic floor exercises  - Return 6 months with a PSA and office visit (will get PSA locally with his PCP) .     Bobbi Au PA-C  Department of Urologic Surgery

## 2019-10-21 DIAGNOSIS — N39.46 MIXED STRESS AND URGE URINARY INCONTINENCE: ICD-10-CM

## 2019-10-23 RX ORDER — OXYBUTYNIN CHLORIDE 5 MG/1
TABLET, EXTENDED RELEASE ORAL
Qty: 30 TABLET | Refills: 5 | OUTPATIENT
Start: 2019-10-23

## 2019-11-10 NOTE — IP AVS SNAPSHOT
[address] [address]    Phone:  [phone]                                       After Visit Summary   6/2/2017    Tunde Albarado    MRN: [id number]           After Visit Summary Signature Page     I have received my discharge instructions, and my questions have been answered. I have discussed any challenges I see with this plan with the nurse or doctor.    ..........................................................................................................................................  Patient/Patient Representative Signature      ..........................................................................................................................................  Patient Representative Print Name and Relationship to Patient    ..................................................               ................................................  Date                                            Time    ..........................................................................................................................................  Reviewed by Signature/Title    ...................................................              ..............................................  Date                                                            Time           152.4

## 2020-03-30 ENCOUNTER — PRE VISIT (OUTPATIENT)
Dept: UROLOGY | Facility: CLINIC | Age: 70
End: 2020-03-30

## 2020-03-30 ENCOUNTER — TRANSFERRED RECORDS (OUTPATIENT)
Dept: HEALTH INFORMATION MANAGEMENT | Facility: CLINIC | Age: 70
End: 2020-03-30

## 2020-03-30 NOTE — TELEPHONE ENCOUNTER
Chief Complaint : Return-6 Mo    Hx/Sx: s/p RALP w/ [facility]     Records/Orders: PSA done 03/30/20 and pt stated the results will be faxed to us    Pt Contacted: Called 03/30/20     At Rooming: Check In

## 2020-04-07 ENCOUNTER — VIRTUAL VISIT (OUTPATIENT)
Dept: UROLOGY | Facility: CLINIC | Age: 70
End: 2020-04-07
Payer: COMMERCIAL

## 2020-04-07 DIAGNOSIS — C61 PROSTATE CANCER (H): Primary | ICD-10-CM

## 2020-04-07 DIAGNOSIS — N39.46 MIXED STRESS AND URGE URINARY INCONTINENCE: ICD-10-CM

## 2020-04-07 RX ORDER — OXYBUTYNIN CHLORIDE 15 MG/1
15 TABLET, EXTENDED RELEASE ORAL DAILY
Qty: 30 TABLET | Refills: 8 | Status: SHIPPED | OUTPATIENT
Start: 2020-04-07 | End: 2020-12-08

## 2020-04-07 RX ORDER — ASPIRIN 81 MG/1
81 TABLET, CHEWABLE ORAL DAILY
COMMUNITY

## 2020-04-07 RX ORDER — ROSUVASTATIN CALCIUM 20 MG/1
TABLET, COATED ORAL
COMMUNITY
Start: 2020-03-22

## 2020-04-07 ASSESSMENT — PAIN SCALES - GENERAL: PAINLEVEL: NO PAIN (0)

## 2020-04-07 NOTE — NURSING NOTE
Called the pt 20, and 12:28 PM and reviewed their medications, history, and allergies. I then asked that they be in a quiet location with limed distractions so they can hear the provider well.    Chief Complaint   Patient presents with     Follow Up     Post RALP       There were no vitals taken for this visit. There is no height or weight on file to calculate BMI.    Patient Active Problem List   Diagnosis     Malignant neoplasm of prostate (H)     Prostate cancer (H)       No Known Allergies    Current Outpatient Medications   Medication Sig Dispense Refill     amLODIPine (NORVASC) 5 MG tablet Take 5 mg by mouth daily  3     aspirin (ASA) 81 MG chewable tablet Take 81 mg by mouth daily       ASPIRIN PO Take 81 mg by mouth every morning        lisinopril-hydrochlorothiazide (PRINZIDE/ZESTORETIC) 10-12.5 MG per tablet Take 1 tablet by mouth every morning        metFORMIN (GLUCOPHAGE) 500 MG tablet        oxybutynin ER (DITROPAN-XL) 10 MG 24 hr tablet Take 1 tablet (10 mg) by mouth daily 30 tablet 11     rosuvastatin (CRESTOR) 20 MG tablet        simvastatin (ZOCOR) 20 MG tablet TAKE ONE TABLET BY MOUTH ONCE DAILY AT BEDTIME  3     acetaminophen (TYLENOL) 325 MG tablet Take 2 tablets (650 mg) by mouth every 4 hours as needed for mild pain 100 tablet 0       Social History     Tobacco Use     Smoking status: Former Smoker     Packs/day: 0.50     Years: 32.00     Pack years: 16.00     Types: Cigarettes     Start date:      Last attempt to quit: 2000     Years since quittin.2     Smokeless tobacco: Never Used   Substance Use Topics     Alcohol use: Yes     Comment: Social     Drug use: No       MARIBELL Aponte,  2020  12:28 PM

## 2020-04-07 NOTE — PROGRESS NOTES
"Tunde Albarado is a 70 year old male who is being evaluated via a billable telephone visit.      The patient has been notified of following:     \"This telephone visit will be conducted via a call between you and your physician/provider. We have found that certain health care needs can be provided without the need for a physical exam.  This service lets us provide the care you need with a short phone conversation.  If a prescription is necessary we can send it directly to your pharmacy.  If lab work is needed we can place an order for that and you can then stop by our lab to have the test done at a later time.    Telephone visits are billed at different rates depending on your insurance coverage. During this emergency period, for some insurers they may be billed the same as an in-person visit.  Please reach out to your insurance provider with any questions.    If during the course of the call the physician/provider feels a telephone visit is not appropriate, you will not be charged for this service.\"    Patient has given verbal consent for Telephone visit?  Yes    Tunde Albarado complains of    Chief Complaint   Patient presents with     Follow Up     Post RALP       I have reviewed and updated the patient's Past Medical History, Social History, Family History and Medication List.    Current Outpatient Medications   Medication Sig Dispense Refill     amLODIPine (NORVASC) 5 MG tablet Take 5 mg by mouth daily  3     aspirin (ASA) 81 MG chewable tablet Take 81 mg by mouth daily       ASPIRIN PO Take 81 mg by mouth every morning        lisinopril-hydrochlorothiazide (PRINZIDE/ZESTORETIC) 10-12.5 MG per tablet Take 1 tablet by mouth every morning        metFORMIN (GLUCOPHAGE) 500 MG tablet        oxybutynin ER (DITROPAN-XL) 10 MG 24 hr tablet Take 1 tablet (10 mg) by mouth daily 30 tablet 11     rosuvastatin (CRESTOR) 20 MG tablet        simvastatin (ZOCOR) 20 MG tablet TAKE ONE TABLET BY MOUTH ONCE DAILY AT BEDTIME  3     " "acetaminophen (TYLENOL) 325 MG tablet Take 2 tablets (650 mg) by mouth every 4 hours as needed for mild pain 100 tablet 0     ALLERGIES: Patient has no known allergies.      Additional provider notes:   HPI: Mr. Tunde Albarado is a 69 year old year old male presenting today for evaluation of chief complaint(s): RECHECK (PSA check )    Mr. Albarado has PMH significant for HTN, HLD and elevated PSA (6.2ng/dL) then prostate cancer s/p RALP with [facility] (Dr. Peace) on 6/2/17.  Path showed: [place] 7 (3+4) adenoca, pT2N0 with no EPE, SVI, LVI, PNI, and the margins were negative.      He was last seen 10/1/19 with an undetectable PSA.  At that time his urine control was continuing to improve a little, and he did think the oxybutynin ER 5mg daily was helping.  He had no side effects.  He was still using pads for leakage with activity.  His AUA SS was 5 \"unhappy\".  His KRISTY was 1 (did not attempt) - not seeking therapy.  His oxybutynin ER dose was increased to 10mg daily.     Today we are having a telephone consultation.  He recently had his PSA checked (3/30/20) and it was undetectable (<0.1ng/dL). On the higher dose of oxybutynin his urinary symptoms continue to improve although still will leak at certain points throughout the day - noon and supper, and he doesn't really know why. No significant fluid or caffeine intake with meals. Also leaks at [employer] (kneeling, standing, sitting). Using 1ppd.    One cup of coffee in the morning and maybe 1 cup after supper.    Still doing home kegel exercises.   Tolerating oxybutynin w/o dry mouth, constipation, confusion.     ROS: DX;d with DM in 7/2019 - this is being well-managed, he states.  Will plan to start walking more now that the weather is improving.     LABS: The last test results for Mr. Tunde Albarado were reviewed:  PSA - No results found for: PSA  BMP -   Recent Labs   Lab Test 07/04/19  0147 06/03/17  0803 06/02/17  2112    140 140   POTASSIUM 4.3 4.4 4.6   CHLORIDE 99 " 108 107   CO2 28 25 25   BUN 32* 21 20   CR 1.41* 1.25 1.31*   * 122* 212*   ISABELA 10.2 8.2* 8.4*   MAG 2.2  --   --        CBC -   Recent Labs   Lab Test 07/04/19  0147 06/03/17  0803 06/02/17  2112   WBC 14.5* 11.1* 12.0*   HGB 14.9 10.7* 11.6*    147* 179       ASSESSMENT:   1) prostate cancer  2) mixed urinary incontinence  3) ED - not seeking treatment    PLAN:   - His current PSA is not sensitive enough.  The standard of care is LESS THAN 0.01ng/dL.  His test measures <0.1ng/dL.  He will discuss this with his PCP and try to get a more sensitive test.   - Will try oxybutynin ER 15mg daily - will RX and monitor for efficacy, side effects.  He will call with any concerns.    - Recheck PSA and office, phone or video visit in 6 months    PHONE CALL LENGTH: 13 minutes    Bobbi Au PA-C  Department of Urologic Surgery

## 2020-09-29 ENCOUNTER — PRE VISIT (OUTPATIENT)
Dept: UROLOGY | Facility: CLINIC | Age: 70
End: 2020-09-29

## 2020-09-29 NOTE — TELEPHONE ENCOUNTER
Chief Complaint : Follow up - 6 Months    Hx/Sx: Prostate cancer (S/p RALP BPLND 6/2/17), mixed incontinence, ED    Records/Orders: PSA not completed    Pt Contacted: Attempted to call to discuss patient getting a PSA. LVM for patient.    At Rooming: SHEILA/KRISTY Steen, EMT

## 2020-10-01 ENCOUNTER — TRANSFERRED RECORDS (OUTPATIENT)
Dept: HEALTH INFORMATION MANAGEMENT | Facility: CLINIC | Age: 70
End: 2020-10-01

## 2020-12-01 ENCOUNTER — PRE VISIT (OUTPATIENT)
Dept: UROLOGY | Facility: CLINIC | Age: 70
End: 2020-12-01

## 2020-12-01 NOTE — TELEPHONE ENCOUNTER
Chief Complaint : Follow up - 6 Months     Hx/Sx: Prostate cancer (S/p RALP BPLND 6/2/17), mixed incontinence, ED     Records/Orders: PSA available     Pt Contacted: N/a     At Rooming: SHEILA/KRISTY Steen, EMT

## 2020-12-08 ENCOUNTER — VIRTUAL VISIT (OUTPATIENT)
Dept: UROLOGY | Facility: CLINIC | Age: 70
End: 2020-12-08
Payer: COMMERCIAL

## 2020-12-08 DIAGNOSIS — N39.46 MIXED STRESS AND URGE URINARY INCONTINENCE: ICD-10-CM

## 2020-12-08 DIAGNOSIS — C61 PROSTATE CANCER (H): Primary | ICD-10-CM

## 2020-12-08 DIAGNOSIS — E11.8 TYPE 2 DIABETES MELLITUS WITH COMPLICATION (H): ICD-10-CM

## 2020-12-08 PROBLEM — E11.9 DIABETES MELLITUS, TYPE 2 (H): Status: ACTIVE | Noted: 2020-12-08

## 2020-12-08 PROCEDURE — 99212 OFFICE O/P EST SF 10 MIN: CPT | Mod: 95 | Performed by: PHYSICIAN ASSISTANT

## 2020-12-08 RX ORDER — OXYBUTYNIN CHLORIDE 15 MG/1
15 TABLET, EXTENDED RELEASE ORAL DAILY
Qty: 30 TABLET | Refills: 11 | Status: SHIPPED | OUTPATIENT
Start: 2020-12-08 | End: 2021-12-30

## 2020-12-08 NOTE — LETTER
"12/8/2020       RE: Tunde Albarado  [address]  [facility] [address]     Dear Colleague,    Thank you for referring your patient, Tunde Albarado, to the [facility] [place] at [facility]. Please see a copy of my visit note below.    Tunde Albarado is a 70 year old male who is being evaluated via a billable telephone visit.      The patient has been notified of following:     \"This telephone visit will be conducted via a call between you and your physician/provider. We have found that certain health care needs can be provided without the need for a physical exam.  This service lets us provide the care you need with a short phone conversation.  If a prescription is necessary we can send it directly to your pharmacy.  If lab work is needed we can place an order for that and you can then stop by our lab to have the test done at a later time.    Telephone visits are billed at different rates depending on your insurance coverage. During this emergency period, for some insurers they may be billed the same as an in-person visit.  Please reach out to your insurance provider with any questions.    If during the course of the call the physician/provider feels a telephone visit is not appropriate, you will not be charged for this service.\"    Patient has given verbal consent for Telephone visit?  Yes    What phone number would you like to be contacted at? [phone]    How would you like to obtain your AVS? Mail a copy    HPI: Mr. Tunde Albarado is a 70 year old year old male presenting today for evaluation of chief complaint(s): Follow Up (6 month )    Mr. Albarado has PMH significant for HTN, HLD, DM (7/4/19 - hgb A1C  8.3%) and elevated PSA (6.2ng/dL) then prostate cancer s/p RALP with BPLND (Dr. Peace) on 6/2/17.  Path showed: [place] 7 (3+4) adenoca, pT2N0 with no EPE, SVI, LVI, PNI, and the margins were negative.      He was last seen 04/07/20 with an " undetectable PSA.  At that time his urinary symptoms were continuing to improve on oxybutynin ER 10mg daily.  He was still having occasional leaking with activity and at other random times and was wearing 1ppd.  No medication S/E's    Today he returns in routine followup. His last PSA was undetectable (<0.1ng/dL) on 10/1/20 ([facility] in [place], [place]).  Today he feels his incontinence is continuing to improve. Lately sometimes he goes 2 days without needing to change a pad.  He attributes this to a higher dose of oxybutynin ER 15mg daily. No dry mouth or constipation.     Recently had a PCP checkup. HGB A1C is now 6.4% and he lost 18lbs. Cholesterol was really improved too.  He is staying on a healthy diet and he has been walking every day.  Hopes to continue walking outside as he hates walking on a treadmill.      Since the last visit has started Vitamin D3 (5000u) daily for low vitamin D.    Erectile fxn: not a concern until bladder leakage resolves.    He and his wife and trying hard to socially distance, even from family/grandchildren.      Current Outpatient Medications   Medication Sig Dispense Refill     amLODIPine (NORVASC) 5 MG tablet Take 5 mg by mouth daily  3     aspirin (ASA) 81 MG chewable tablet Take 81 mg by mouth daily       ASPIRIN PO Take 81 mg by mouth every morning        lisinopril-hydrochlorothiazide (PRINZIDE/ZESTORETIC) 10-12.5 MG per tablet Take 1 tablet by mouth every morning        metFORMIN (GLUCOPHAGE) 500 MG tablet        oxybutynin ER (DITROPAN XL) 15 MG 24 hr tablet Take 1 tablet (15 mg) by mouth daily 30 tablet 8     simvastatin (ZOCOR) 20 MG tablet TAKE ONE TABLET BY MOUTH ONCE DAILY AT BEDTIME  3     acetaminophen (TYLENOL) 325 MG tablet Take 2 tablets (650 mg) by mouth every 4 hours as needed for mild pain (Patient not taking: Reported on 12/8/2020) 100 tablet 0     rosuvastatin (CRESTOR) 20 MG tablet          ALLERGIES: Patient has no known allergies.      REVIEW  OF SYSTEMS:  As above in HPI    GENERAL PHYSICAL EXAM:   Vitals: There were no vitals taken for this visit.  There is no height or weight on file to calculate BMI.    NEURO: Alert and oriented x 3.  PSYCH: Normal mood and affect, pleasant and agreeable during interview     LABS: The last test results for Mr. Tunde Albarado were reviewed:  PSA - No results found for: PSA  BMP -   Recent Labs   Lab Test 07/04/19 0147 06/03/17  0803 06/02/17  2112    140 140   POTASSIUM 4.3 4.4 4.6   CHLORIDE 99 108 107   CO2 28 25 25   BUN 32* 21 20   CR 1.41* 1.25 1.31*   * 122* 212*   ISABELA 10.2 8.2* 8.4*   MAG 2.2  --   --        CBC -   Recent Labs   Lab Test 07/04/19 0147 06/03/17  0803 06/02/17 2112   WBC 14.5* 11.1* 12.0*   HGB 14.9 10.7* 11.6*    147* 179     ASSESSMENT:   1) prostate cancer  s/p RALP with BPL[facility] (Dr. Peace) on 6/2/17.  Path showed: Ami 7 (3+4), pT2N0, negative margins - PSA remains undetectable (<0.1ng/dL from [place] lab)  2) overactive bladder - on oxybutynin ER 15mg daily    PLAN:   - Continue your healthy diet and walking every day!  - Will refill oxybutynin ER 15mg daily  - Return for a virtual visit in 6 months with a PSA first    TELEPHONE DURATION: 12 minutes    Bobbi Au PA-C  Department of Urologic Surgery

## 2020-12-08 NOTE — PROGRESS NOTES
"Tunde Albarado is a 70 year old male who is being evaluated via a billable telephone visit.      The patient has been notified of following:     \"This telephone visit will be conducted via a call between you and your physician/provider. We have found that certain health care needs can be provided without the need for a physical exam.  This service lets us provide the care you need with a short phone conversation.  If a prescription is necessary we can send it directly to your pharmacy.  If lab work is needed we can place an order for that and you can then stop by our lab to have the test done at a later time.    Telephone visits are billed at different rates depending on your insurance coverage. During this emergency period, for some insurers they may be billed the same as an in-person visit.  Please reach out to your insurance provider with any questions.    If during the course of the call the physician/provider feels a telephone visit is not appropriate, you will not be charged for this service.\"    Patient has given verbal consent for Telephone visit?  Yes    What phone number would you like to be contacted at? [phone]    How would you like to obtain your AVS? Mail a copy    HPI: Mr. Tunde Albarado is a 70 year old year old male presenting today for evaluation of chief complaint(s): Follow Up (6 month )    Mr. Albarado has PMH significant for HTN, HLD, DM (7/4/19 - hgb A1C  8.3%) and elevated PSA (6.2ng/dL) then prostate cancer s/p RALP with BPLND (Dr. Peace) on 6/2/17.  Path showed: [place] 7 (3+4) adenoca, pT2N0 with no EPE, SVI, LVI, PNI, and the margins were negative.      He was last seen 04/07/20 with an undetectable PSA.  At that time his urinary symptoms were continuing to improve on oxybutynin ER 10mg daily.  He was still having occasional leaking with activity and at other random times and was wearing 1ppd.  No medication S/E's    Today he returns in routine followup. His last PSA was undetectable " (<0.1ng/dL) on 10/1/20 ([facility] in [place], [place]).  Today he feels his incontinence is continuing to improve. Lately sometimes he goes 2 days without needing to change a pad.  He attributes this to a higher dose of oxybutynin ER 15mg daily. No dry mouth or constipation.     Recently had a PCP checkup. HGB A1C is now 6.4% and he lost 18lbs. Cholesterol was really improved too.  He is staying on a healthy diet and he has been walking every day.  Hopes to continue walking outside as he hates walking on a treadmill.      Since the last visit has started Vitamin D3 (5000u) daily for low vitamin D.    Erectile fxn: not a concern until bladder leakage resolves.    He and his wife and trying hard to socially distance, even from family/grandchildren.      Current Outpatient Medications   Medication Sig Dispense Refill     amLODIPine (NORVASC) 5 MG tablet Take 5 mg by mouth daily  3     aspirin (ASA) 81 MG chewable tablet Take 81 mg by mouth daily       ASPIRIN PO Take 81 mg by mouth every morning        lisinopril-hydrochlorothiazide (PRINZIDE/ZESTORETIC) 10-12.5 MG per tablet Take 1 tablet by mouth every morning        metFORMIN (GLUCOPHAGE) 500 MG tablet        oxybutynin ER (DITROPAN XL) 15 MG 24 hr tablet Take 1 tablet (15 mg) by mouth daily 30 tablet 8     simvastatin (ZOCOR) 20 MG tablet TAKE ONE TABLET BY MOUTH ONCE DAILY AT BEDTIME  3     acetaminophen (TYLENOL) 325 MG tablet Take 2 tablets (650 mg) by mouth every 4 hours as needed for mild pain (Patient not taking: Reported on 12/8/2020) 100 tablet 0     rosuvastatin (CRESTOR) 20 MG tablet          ALLERGIES: Patient has no known allergies.      REVIEW OF SYSTEMS:  As above in HPI    GENERAL PHYSICAL EXAM:   Vitals: There were no vitals taken for this visit.  There is no height or weight on file to calculate BMI.    NEURO: Alert and oriented x 3.  PSYCH: Normal mood and affect, pleasant and agreeable during interview     LABS: The last test results  for Mr. Tunde WALLS Per were reviewed:  PSA - No results found for: PSA  BMP -   Recent Labs   Lab Test 07/04/19  0147 06/03/17  0803 06/02/17 2112    140 140   POTASSIUM 4.3 4.4 4.6   CHLORIDE 99 108 107   CO2 28 25 25   BUN 32* 21 20   CR 1.41* 1.25 1.31*   * 122* 212*   ISABELA 10.2 8.2* 8.4*   MAG 2.2  --   --        CBC -   Recent Labs   Lab Test 07/04/19  0147 06/03/17  0803 06/02/17 2112   WBC 14.5* 11.1* 12.0*   HGB 14.9 10.7* 11.6*    147* 179       ASSESSMENT:   1) prostate cancer  s/p RALP with BPLND (Dr. Peace) on 6/2/17.  Path showed: Ami 7 (3+4), pT2N0, negative margins - PSA remains undetectable (<0.1ng/dL from [place] lab)  2) overactive bladder - on oxybutynin ER 15mg daily    PLAN:   - Continue your healthy diet and walking every day!  - Will refill oxybutynin ER 15mg daily  - Return for a virtual visit in 6 months with a PSA first    TELEPHONE DURATION: 12 minutes    Bobbi Au PA-C  Department of Urologic Surgery

## 2020-12-08 NOTE — PATIENT INSTRUCTIONS
PLAN:   - Continue your healthy diet and walking every day!  - Will refill oxybutynin ER 15mg daily  - Return for a virtual visit in 6 months with a PSA first    [facility]

## 2020-12-09 ENCOUNTER — TELEPHONE (OUTPATIENT)
Dept: UROLOGY | Facility: CLINIC | Age: 70
End: 2020-12-09

## 2021-02-08 DIAGNOSIS — N39.46 MIXED STRESS AND URGE URINARY INCONTINENCE: ICD-10-CM

## 2021-02-10 RX ORDER — OXYBUTYNIN CHLORIDE 15 MG/1
TABLET, EXTENDED RELEASE ORAL
Qty: 30 TABLET | Refills: 8 | OUTPATIENT
Start: 2021-02-10

## 2021-06-23 ENCOUNTER — TRANSFERRED RECORDS (OUTPATIENT)
Dept: HEALTH INFORMATION MANAGEMENT | Facility: CLINIC | Age: 71
End: 2021-06-23

## 2021-12-28 DIAGNOSIS — N39.46 MIXED STRESS AND URGE URINARY INCONTINENCE: ICD-10-CM

## 2021-12-30 RX ORDER — OXYBUTYNIN CHLORIDE 15 MG/1
15 TABLET, EXTENDED RELEASE ORAL DAILY
Qty: 30 TABLET | Refills: 0 | Status: SHIPPED | OUTPATIENT
Start: 2021-12-30 | End: 2022-02-02

## 2021-12-30 NOTE — TELEPHONE ENCOUNTER
oxybutynin ER (DITROPAN XL) 15 MG 24 hr tablet    Last Written Prescription Date: 12/8/20  Last Fill Quantity: 30,   # refills: 11  Last Office Visit : 12/8/20  Future Office visit:  None    Scheduling has been notified to contact the pt for appointment.

## 2021-12-31 ENCOUNTER — TELEPHONE (OUTPATIENT)
Dept: UROLOGY | Facility: CLINIC | Age: 71
End: 2021-12-31
Payer: COMMERCIAL

## 2022-01-06 ENCOUNTER — VIRTUAL VISIT (OUTPATIENT)
Dept: UROLOGY | Facility: CLINIC | Age: 72
End: 2022-01-06
Payer: COMMERCIAL

## 2022-01-06 DIAGNOSIS — C61 PROSTATE CANCER (H): ICD-10-CM

## 2022-01-06 DIAGNOSIS — N39.46 MIXED STRESS AND URGE URINARY INCONTINENCE: Primary | ICD-10-CM

## 2022-01-06 PROCEDURE — 99212 OFFICE O/P EST SF 10 MIN: CPT | Mod: 95 | Performed by: PHYSICIAN ASSISTANT

## 2022-01-06 RX ORDER — LOSARTAN POTASSIUM AND HYDROCHLOROTHIAZIDE 12.5; 1 MG/1; MG/1
1 TABLET ORAL DAILY
COMMUNITY
Start: 2021-10-26

## 2022-01-06 NOTE — PROGRESS NOTES
"HPI: Mr. Tunde Albarado is a 71 year old year old male presenting today for evaluation of chief complaint(s): Follow Up (Prostate surgery)    Today, he is unaccompanied     Mr. Albarado has PMH significant for HTN, HLD, DM (7/4/19 - hgb A1C  8.3%) and elevated PSA (6.2ng/dL) then prostate cancer s/p RALP with BPLND (Dr. Peace) on 6/2/17.  Path showed: [place] 7 (3+4) adenoca, pT2N0 with no EPE, SVI, LVI, PNI, and the margins were negative.      He was last seen 12/08/20 with an undetectable PSA (10/1/20 - <0.01ug/L).  His incontinence was improving on oxybutynin ER 15mg.  Was changing pad every 2 days. HGB A1C was down to 6.4% and he lost 18lbs.  He wasn't concerned about ED.      Today he returns in routine followup.  On 6/23/21 - PSA was undetectable.  He says there is another PSA from 10/2021 performed by his PCP that was also undetectable.    Urinary symptoms have been \"pretty much the same\", and this is disappointing for him. Currently changing pad every 1-1/2 days.   - Typically will leak in the morning, just prior to mealtime, or after supper.  These are the times of day when he coughs more readily.    - Never leaks with walking 2 miles per day during warm weather.  Never leaks with mowing the lawn.    - No urgency whatsoever.     - Strong stream with urinating.  Feels like he empties 99.9%.    - Tolerating the oxybutynin easily.     Current Outpatient Medications   Medication Sig Dispense Refill     acetaminophen (TYLENOL) 325 MG tablet Take 2 tablets (650 mg) by mouth every 4 hours as needed for mild pain (Patient not taking: Reported on 12/8/2020) 100 tablet 0     amLODIPine (NORVASC) 5 MG tablet Take 5 mg by mouth daily  3     aspirin (ASA) 81 MG chewable tablet Take 81 mg by mouth daily       ASPIRIN PO Take 81 mg by mouth every morning        lisinopril-hydrochlorothiazide (PRINZIDE/ZESTORETIC) 10-12.5 MG per tablet Take 1 tablet by mouth every morning        metFORMIN (GLUCOPHAGE) 500 MG tablet        " oxybutynin ER (DITROPAN XL) 15 MG 24 hr tablet Take 1 tablet (15 mg) by mouth daily Call clinic to schedule follow up appointment. 30 tablet 0     rosuvastatin (CRESTOR) 20 MG tablet        simvastatin (ZOCOR) 20 MG tablet TAKE ONE TABLET BY MOUTH ONCE DAILY AT BEDTIME  3       ALLERGIES: Patient has no known allergies.      REVIEW OF SYSTEMS:  As above in HPI    GENERAL PHYSICAL EXAM: VIRTUAL VISIT  Vitals: There were no vitals taken for this visit.  There is no height or weight on file to calculate BMI.    NEURO: Alert and oriented x 3.  PSYCH: Normal mood and affect, pleasant and agreeable during interview      RADIOLOGY: The following tests were reviewed:   None recent relevant    LABS: The last test results for Mr. Tunde Albarado were reviewed:  PSA - No results found for: PSA  BMP -   Recent Labs   Lab Test 07/04/19 0147 06/03/17  0803 06/02/17 2112    140 140   POTASSIUM 4.3 4.4 4.6   CHLORIDE 99 108 107   CO2 28 25 25   BUN 32* 21 20   CR 1.41* 1.25 1.31*   * 122* 212*   ISABELA 10.2 8.2* 8.4*   MAG 2.2  --   --        CBC -   Recent Labs   Lab Test 07/04/19 0147 06/03/17 0803 06/02/17 2112   WBC 14.5* 11.1* 12.0*   HGB 14.9 10.7* 11.6*    147* 179       ASSESSMENT:   1) prostate cancer  s/p RALP with BPLND (Dr. Peace) on 6/2/17.  Path showed: Ami 7 (3+4), pT2N0, negative margins - PSA remains undetectable (<[facility]ng[facility], although pt sts there is a most recent undetectable value from 10/2021)  2) overactive bladder - on oxybutynin ER 15mg daily  3) PPI - mild leak with coughing.     PLAN:   - Get back to walking - can do this indoors, as discussed.   - Continue PSA checks every year up through June of 2027.   - You will be due for next PSA in 6/2022.  You can do this with your PCP.   - Please consider pelvic floor physical therapy (to help strengthen your pelvic muscles to protect you from leakage with coughing).    - Also discussed male sling as a surgical  mgmt option for persistent post-prostatectomy incontinence.   If he wishes a consultation for this, he could see Dr. Turk or David.   - Follow up in July 2022 to readdress urinary symptoms and discuss updated PSA    TELEPHONE DURATION: 16 minutes (4:17 - 4:28 + 5 minutes documentation on dos)    Bobbi Au PA-C  Department of Urologic Surgery

## 2022-01-06 NOTE — PROGRESS NOTES
Evan is a 71 year old who is being evaluated via a billable telephone visit.      What phone number would you like to be contacted at? [phone]  How would you like to obtain your AVS? Mail a copy  TELEPHONE DURATION: 16 minutes (4:17 - 4:28 + 5 minutes documentation on dos)

## 2022-01-06 NOTE — LETTER
"1/6/2022       RE: Tunde Albarado  [address]  [facility] [address]     Dear Colleague,    Thank you for referring your patient, Tunde Albarado, to the [facility]OLOG[facility] [place] at [facility]. Please see a copy of my visit note below.    HPI: Mr. Tunde Albarado is a 71 year old year old male presenting today for evaluation of chief complaint(s): Follow Up (Prostate surgery)    Today, he is unaccompanied     Mr. Albarado has PMH significant for HTN, HLD, DM (7/4/19 - hgb A1C  8.3%) and elevated PSA (6.2ng/dL) then prostate cancer s/p RALP with BPLND (Dr. Peace) on 6/2/17.  Path showed: [place] 7 (3+4) adenoca, pT2N0 with no EPE, SVI, LVI, PNI, and the margins were negative.      He was last seen 12/08/20 with an undetectable PSA (10/1/20 - <0.01ug/L).  His incontinence was improving on oxybutynin ER 15mg.  Was changing pad every 2 days. HGB A1C was down to 6.4% and he lost 18lbs.  He wasn't concerned about ED.      Today he returns in routine followup.  On 6/23/21 - PSA was undetectable.  He says there is another PSA from 10/2021 performed by his PCP that was also undetectable.    Urinary symptoms have been \"pretty much the same\", and this is disappointing for him. Currently changing pad every 1-1/2 days.   - Typically will leak in the morning, just prior to mealtime, or after supper.  These are the times of day when he coughs more readily.    - Never leaks with walking 2 miles per day during warm weather.  Never leaks with mowing the lawn.    - No urgency whatsoever.     - Strong stream with urinating.  Feels like he empties 99.9%.    - Tolerating the oxybutynin easily.     Current Outpatient Medications   Medication Sig Dispense Refill     acetaminophen (TYLENOL) 325 MG tablet Take 2 tablets (650 mg) by mouth every 4 hours as needed for mild pain (Patient not taking: Reported on 12/8/2020) 100 tablet 0     amLODIPine (NORVASC) 5 MG tablet Take 5 mg by " mouth daily  3     aspirin (ASA) 81 MG chewable tablet Take 81 mg by mouth daily       ASPIRIN PO Take 81 mg by mouth every morning        lisinopril-hydrochlorothiazide (PRINZIDE/ZESTORETIC) 10-12.5 MG per tablet Take 1 tablet by mouth every morning        metFORMIN (GLUCOPHAGE) 500 MG tablet        oxybutynin ER (DITROPAN XL) 15 MG 24 hr tablet Take 1 tablet (15 mg) by mouth daily Call clinic to schedule follow up appointment. 30 tablet 0     rosuvastatin (CRESTOR) 20 MG tablet        simvastatin (ZOCOR) 20 MG tablet TAKE ONE TABLET BY MOUTH ONCE DAILY AT BEDTIME  3       ALLERGIES: Patient has no known allergies.      REVIEW OF SYSTEMS:  As above in HPI    GENERAL PHYSICAL EXAM: VIRTUAL VISIT  Vitals: There were no vitals taken for this visit.  There is no height or weight on file to calculate BMI.    NEURO: Alert and oriented x 3.  PSYCH: Normal mood and affect, pleasant and agreeable during interview      RADIOLOGY: The following tests were reviewed:   None recent relevant    LABS: The last test results for Mr. Tunde Albarado were reviewed:  PSA - No results found for: PSA  BMP -   Recent Labs   Lab Test 07/04/19 0147 06/03/17  0803 06/02/17 2112    140 140   POTASSIUM 4.3 4.4 4.6   CHLORIDE 99 108 107   CO2 28 25 25   BUN 32* 21 20   CR 1.41* 1.25 1.31*   * 122* 212*   ISABELA 10.2 8.2* 8.4*   MAG 2.2  --   --        CBC -   Recent Labs   Lab Test 07/04/19 0147 06/03/17  0803 06/02/17 2112   WBC 14.5* 11.1* 12.0*   HGB 14.9 10.7* 11.6*    147* 179     ASSESSMENT:   1) prostate cancer  s/p RALP with BPLND (Dr. Peace) on 6/2/17.  Path showed: [place] 7 (3+4), pT2N0, negative margins - PSA remains undetectable (<0.1ng/dL from [facility] lab in 6/2021, although pt sts there is a most recent undetectable value from 10/2021)  2) overactive bladder - on oxybutynin ER 15mg daily  3) PPI - mild leak with coughing.     PLAN:   - Get back to walking - can do this indoors, as discussed.   - Continue PSA  checks every year up through June of 2027.   - You will be due for next PSA in 6/2022.  You can do this with your PCP.   - Please consider pelvic floor physical therapy (to help strengthen your pelvic muscles to protect you from leakage with coughing).    - Also discussed male sling as a surgical mgmt option for persistent post-prostatectomy incontinence.   If he wishes a consultation for this, he could see Dr. Turk or David.   - Follow up in July 2022 to readdress urinary symptoms and discuss updated PSA    TELEPHONE DURATION: 16 minutes (4:17 - 4:28 + 5 minutes documentation on dos)    Bobbi Au PA-C  Department of Urologic Surgery    Evan is a 71 year old who is being evaluated via a billable telephone visit.      What phone number would you like to be contacted at? [phone]  How would you like to obtain your AVS? Mail a copy  TELEPHONE DURATION: 16 minutes (4:17 - 4:28 + 5 minutes documentation on dos)

## 2022-01-07 NOTE — PATIENT INSTRUCTIONS
PLAN:   - Get back to walking - can do this indoors, as discussed.   - Continue PSA checks every year up through June of 2027.   - You will be due for next PSA in 6/2022.  You can do this with your PCP.   - Please consider pelvic floor physical therapy (to help strengthen your pelvic muscles to protect you from leakage with coughing).    - Also discussed male sling as a surgical mgmt option for persistent post-prostatectomy incontinence.   If you wish a consultation for this, you could see Dr. Turk or David.   - Follow up in July 2022 to readdress urinary symptoms and discuss updated PSA    [facility]

## 2022-01-29 DIAGNOSIS — N39.46 MIXED STRESS AND URGE URINARY INCONTINENCE: ICD-10-CM

## 2022-02-02 RX ORDER — OXYBUTYNIN CHLORIDE 15 MG/1
15 TABLET, EXTENDED RELEASE ORAL DAILY
Qty: 90 TABLET | Refills: 3 | Status: SHIPPED | OUTPATIENT
Start: 2022-02-02 | End: 2023-01-26

## 2022-02-02 NOTE — TELEPHONE ENCOUNTER
1/6/2022  [facility] [place]   Fifi Au PA    Urology    oxybutynin ER (DITROPAN XL) 15 MG 24 hr tablet

## 2022-06-23 ENCOUNTER — PRE VISIT (OUTPATIENT)
Dept: UROLOGY | Facility: CLINIC | Age: 72
End: 2022-06-23

## 2022-06-23 NOTE — TELEPHONE ENCOUNTER
Reason for visit: follow up     Relevant information: incontinence    Records/imaging/labs/orders: PSA in epic    Pt called: no    At Rooming: normal

## 2022-06-27 ENCOUNTER — TRANSFERRED RECORDS (OUTPATIENT)
Dept: HEALTH INFORMATION MANAGEMENT | Facility: CLINIC | Age: 72
End: 2022-06-27

## 2022-07-07 ENCOUNTER — VIRTUAL VISIT (OUTPATIENT)
Dept: UROLOGY | Facility: CLINIC | Age: 72
End: 2022-07-07
Payer: COMMERCIAL

## 2022-07-07 DIAGNOSIS — N39.3 STRESS INCONTINENCE, MALE: Primary | ICD-10-CM

## 2022-07-07 PROCEDURE — 99212 OFFICE O/P EST SF 10 MIN: CPT | Mod: 95 | Performed by: PHYSICIAN ASSISTANT

## 2022-07-07 RX ORDER — VALSARTAN 320 MG/1
320 TABLET ORAL DAILY
COMMUNITY
Start: 2022-06-30

## 2022-07-07 NOTE — PROGRESS NOTES
Evan is a 72 year old who is being evaluated via a billable video visit.      How would you like to obtain your AVS? Mail a copy  If the video visit is dropped, the invitation should be resent by: Text to cell phone: [phone]  Will anyone else be joining your video visit? No    Video-Visit Details    Video Start Time: 2:19    Type of service:  Video Visit    Video End Time:2:31    Originating Location (pt. Location): Home    Distant Location (provider location):  [facility] UROLOGY CLIN[facility] [place]     Platform used for Video Visit: [employer]   VIDEO VISIT: 17 minutes (2:19 -2:31 + 5 minutes documentation on DOS)

## 2022-07-07 NOTE — LETTER
7/7/2022       RE: Tunde Albarado  [address]  [facility] [address]     Dear Colleague,    Thank you for referring your patient, Tunde Albarado, to the [facility] UROLOGY CLINIC [place] at [facility]. Please see a copy of my visit note below.    Evan is a 72 year old who is being evaluated via a billable video visit.      How would you like to obtain your AVS? Mail a copy  If the video visit is dropped, the invitation should be resent by: Text to cell phone: [phone]  Will anyone else be joining your video visit? No    Video-Visit Details    Video Start Time: 2:19    Type of service:  Video Visit    Video End Time:2:31    Originating Location (pt. Location): Home    Distant Location (provider location):  [facility] UROLOGY CLINIC [place]     Platform used for Video Visit: [employer]   VIDEO VISIT: 17 minutes (2:19 -2:31 + 5 minutes documentation on DOS)    HPI: Mr. Tunde Albarado is a 72 year old year old male presenting today for evaluation of chief complaint(s): Follow Up    Today, he is unaccompanied on video    Mr. Albarado has PMH significant for HTN, HLD, DM and prostate cancer s/p RALP with BPLND (Dr. Peace) on 6/2/17.  Path showed: Ami 7 (3+4) adenoca, pT2N0 with no EPE, SVI, LVI, PNI, and the margins were negative.      He was last seen 1/6/22 with an undetectable PSA (<0.1ng/dL from [place] lab in 6/2021, although pt sts there is a most recent undetectable value from 10/2021)  He was continuing to have leakage in the mornings, just prior to mealtime or after support.  He never leaked with walking 2 miles per day.  He was taking oxybutynin and had no urgency. I asked him to continue returning to PFPT.  Also offered to refer to Dr. Turk / David to consider a male sling.     Today he returns with in routine followup.  Had PSA checked 2 weeks ago with his PCP and was told the result was undetectable.    - Had a bad cough for the  last month or so.  Tested negative for COVID. Thinks he just had a cold or another virus.  Otherwise he has been pretty stable in general.    - While he was coughing his incontinence really worsened, but now symptoms are back to baseline.  Using 1ppd currently.    - Not interested in treating ED.        Current Outpatient Medications   Medication Sig Dispense Refill     amLODIPine (NORVASC) 5 MG tablet Take 5 mg by mouth daily  3     aspirin (ASA) 81 MG chewable tablet Take 81 mg by mouth daily       cholecalciferol (VITAMIN D3) 125 mcg (5000 units) capsule TAKE ONE CAPSULE BY MOUTH ONCE DAILY       metFORMIN (GLUCOPHAGE) 500 MG tablet        oxybutynin ER (DITROPAN XL) 15 MG 24 hr tablet Take 1 tablet (15 mg) by mouth daily 90 tablet 3     rosuvastatin (CRESTOR) 20 MG tablet        valsartan (DIOVAN) 320 MG tablet Take 320 mg by mouth daily       ASPIRIN PO Take 81 mg by mouth every morning        losartan-hydrochlorothiazide (HYZAAR) 100-12.5 MG tablet Take 1 tablet by mouth daily (Patient not taking: Reported on 7/7/2022)         ALLERGIES: Patient has no known allergies.      REVIEW OF SYSTEMS:  As above in HPI    GENERAL PHYSICAL EXAM: Virtual Visit  Vitals: There were no vitals taken for this visit.  There is no height or weight on file to calculate BMI.    GENERAL: Well groomed, well developed, well nourished male in NAD.  NEURO: Alert and oriented x 3.  PSYCH: Normal mood and affect, pleasant and agreeable during interview and exam.    LABS: The last test results for Mr. Tunde Albarado were reviewed:  PSA - No results found for: PSA  BMP -   Recent Labs   Lab Test 07/04/19 0147 06/03/17  0803 06/02/17 2112    140 140   POTASSIUM 4.3 4.4 4.6   CHLORIDE 99 108 107   CO2 28 25 25   BUN 32* 21 20   CR 1.41* 1.25 1.31*   * 122* 212*   ISABELA 10.2 8.2* 8.4*   MAG 2.2  --   --        CBC -   Recent Labs   Lab Test 07/04/19 0147 06/03/17  0803 06/02/17 2112   WBC 14.5* 11.1* 12.0*   HGB 14.9 10.7* 11.6*     147* 179       ASSESSMENT:   ASSESSMENT:   1) prostate cancer  s/p RALP with BPLND (Dr. Peace) on 6/2/17.  Path showed: [place] 7 (3+4), pT2N0, negative margins - PSA remains undetectable according to patient  2) overactive bladder - on oxybutynin ER 15mg daily  3) PPI - mild leak with coughing.        PLAN:   - RX: Pelvic floor physical therapy - will plan to do this through [facility]   - Please fax most recent PSA to us.    - Recheck PSA in 1 year with your local provider.  As long as PSA remains undetectable, things are good.  If your PSA is no longer undetectable, please notify us immediately  - Return in 3-4 months for virtual appt to discuss whether physical therapy is helping.  If not, we will consider referral to Dr. Turk or Che[place].     VIDEO VISIT: 17 minutes (2:19 -2:31 + 5 minutes documentation on DOS)    Bobbi Au PA-C  Department of Urologic Surgery

## 2022-07-07 NOTE — PATIENT INSTRUCTIONS
PLAN:   - RX: Pelvic floor physical therapy - will plan to do this through [facility]   - Please fax most recent PSA to us.    - Recheck PSA in 1 year with your local provider.  As long as PSA remains undetectable, things are good.  If your PSA is no longer undetectable, please notify us immediately  - Return in 3-4 months for virtual appt to discuss whether physical therapy is helping.  If not, we will consider referral to Dr. Turk or Cheko.     [facility]

## 2022-07-07 NOTE — PROGRESS NOTES
HPI: Mr. Tunde Albarado is a 72 year old year old male presenting today for evaluation of chief complaint(s): Follow Up    Today, he is unaccompanied on video    Mr. Albarado has PMH significant for HTN, HLD, DM and prostate cancer s/p RALP with BPLND (Dr. Peace) on 6/2/17.  Path showed: [place] 7 (3+4) adenoca, pT2N0 with no EPE, SVI, LVI, PNI, and the margins were negative.      He was last seen 1/6/22 with an undetectable PSA (<0.1ng/dL from [facility] lab in 6/2021, although pt sts there is a most recent undetectable value from 10/2021)  He was continuing to have leakage in the mornings, just prior to mealtime or after support.  He never leaked with walking 2 miles per day.  He was taking oxybutynin and had no urgency. I asked him to continue returning to PFPT.  Also offered to refer to Dr. Turk / David to consider a male sling.     Today he returns with in routine followup.  Had PSA checked 2 weeks ago with his PCP and was told the result was undetectable.    - Had a bad cough for the last month or so.  Tested negative for COVID. Thinks he just had a cold or another virus.  Otherwise he has been pretty stable in general.    - While he was coughing his incontinence really worsened, but now symptoms are back to baseline.  Using 1ppd currently.    - Not interested in treating ED.        Current Outpatient Medications   Medication Sig Dispense Refill     amLODIPine (NORVASC) 5 MG tablet Take 5 mg by mouth daily  3     aspirin (ASA) 81 MG chewable tablet Take 81 mg by mouth daily       cholecalciferol (VITAMIN D3) 125 mcg (5000 units) capsule TAKE ONE CAPSULE BY MOUTH ONCE DAILY       metFORMIN (GLUCOPHAGE) 500 MG tablet        oxybutynin ER (DITROPAN XL) 15 MG 24 hr tablet Take 1 tablet (15 mg) by mouth daily 90 tablet 3     rosuvastatin (CRESTOR) 20 MG tablet        valsartan (DIOVAN) 320 MG tablet Take 320 mg by mouth daily       ASPIRIN PO Take 81 mg by mouth every morning        losartan-hydrochlorothiazide  (HYZAAR) 100-12.5 MG tablet Take 1 tablet by mouth daily (Patient not taking: Reported on 7/7/2022)         ALLERGIES: Patient has no known allergies.      REVIEW OF SYSTEMS:  As above in HPI    GENERAL PHYSICAL EXAM: Virtual Visit  Vitals: There were no vitals taken for this visit.  There is no height or weight on file to calculate BMI.    GENERAL: Well groomed, well developed, well nourished male in NAD.  NEURO: Alert and oriented x 3.  PSYCH: Normal mood and affect, pleasant and agreeable during interview and exam.    LABS: The last test results for Mr. Tunde Albarado were reviewed:  PSA - No results found for: PSA  BMP -   Recent Labs   Lab Test 07/04/19  0147 06/03/17  0803 06/02/17 2112    140 140   POTASSIUM 4.3 4.4 4.6   CHLORIDE 99 108 107   CO2 28 25 25   BUN 32* 21 20   CR 1.41* 1.25 1.31*   * 122* 212*   ISABELA 10.2 8.2* 8.4*   MAG 2.2  --   --        CBC -   Recent Labs   Lab Test 07/04/19 0147 06/03/17  0803 06/02/17 2112   WBC 14.5* 11.1* 12.0*   HGB 14.9 10.7* 11.6*    147* 179       ASSESSMENT:   ASSESSMENT:   1) prostate cancer  s/p RALP with BPLND (Dr. Peace) on 6/2/17.  Path showed: [place] 7 (3+4), pT2N0, negative margins - PSA remains undetectable according to patient  2) overactive bladder - on oxybutynin ER 15mg daily  3) PPI - mild leak with coughing.        PLAN:   - RX: Pelvic floor physical therapy - will plan to do this through [facility]   - Please fax most recent PSA to us.    - Recheck PSA in 1 year with your local provider.  As long as PSA remains undetectable, things are good.  If your PSA is no longer undetectable, please notify us immediately  - Return in 3-4 months for virtual appt to discuss whether physical therapy is helping.  If not, we will consider referral to Dr. Turk or [place].     VIDEO VISIT: 17 minutes (2:19 -2:31 + 5 minutes documentation on DOS)    Bobbi Au PA-C  Department of Urologic Surgery

## 2022-10-07 ENCOUNTER — THERAPY VISIT (OUTPATIENT)
Dept: PHYSICAL THERAPY | Facility: CLINIC | Age: 72
End: 2022-10-07
Payer: MEDICARE

## 2022-10-07 DIAGNOSIS — N39.3 STRESS INCONTINENCE, MALE: ICD-10-CM

## 2022-10-07 PROCEDURE — 97112 NEUROMUSCULAR REEDUCATION: CPT | Mod: GP | Performed by: PHYSICAL THERAPIST

## 2022-10-07 PROCEDURE — 97161 PT EVAL LOW COMPLEX 20 MIN: CPT | Mod: GP | Performed by: PHYSICAL THERAPIST

## 2022-10-07 NOTE — PROGRESS NOTES
Physical Therapy Initial Evaluation  Subjective:        SUBJECTIVE:  Patient is s/p prostatectomy on 2017.   Urination:  Do you feel the sensation of your bladder filling? No  How many pads per day are you using? 1-2 Depends   Do you leak on the way to the bathroom or with a strong urge to void? Yes  Do you leak with cough,sneeze, jumping, running? Yes  Do you have triggers that make you feel you can't wait to go to the bathroom? No How long can you delay the need to urinate? 3 - 10 minutes.   How many times do you get up to urinate at night? 1  Can you stop the flow of urine when on the toilet? Yes  Is the volume of urine passed usually: medium. (8sec rule= 250ml with average bladder storing 400-600ml)  Do you strain to pass urine? No  Do you have a slow or hesitant urinary stream? No  Do you have difficulty initiating the urine stream? No  Fluid intake(one glass is 8oz or one cup) 4 glasses/day, 2 caffinated glasses/day  0 alcohol glasses/day.      The history is provided by the patient. No  was used.   Therapist Generated HPI Evaluation         Type of problem:  Incontinence.    This is a chronic condition.  Condition occurred with:  After pregnancy.  Where condition occurred: other.  Patient reports pain:  N/a.      Since onset symptoms are unchanged.  Symptoms are exacerbated by coughing and sneezing (activity, rising from chair, tying shoes )  Relieved by: urinating.    Past treatment: previous therapy without biofeedback 5 years ago.   Work activity restrictions: retired   Barriers include:  None as reported by patient.                        Objective:  System                                 Pelvic Dysfunction Evaluation:        Flexibility:    Tightness present at:Adductors; Iliopsoas; Hamstrings and Piriformis    Abdominal Wall:  normal              External Assessment:    Skin Condition:  Normal    Bearing Down/Coughing:  Normal        Internal Assessment:      Contraction/Grade:  Fair  squeeze, definite lift (3)          SEMG Biofeedback:    Equipment:  Biofeedback     Suraface electrode placement--Perianal:  Bilateral   Baseline EMG PM:  5 mV    Peak pelvic muscle contraction:  8 mV    EMG interpretation to fatigue:  3-5 seconds  Position:  SupineAdditional History:        Caffeine Consumption:  2 cups of coffee                     General     ROS    Assessment/Plan:    Patient is a 72 year old male with pelvic complaints.    Patient has the following significant findings with corresponding treatment plan.                Diagnosis 1:  Post op incontinence  Decreased strength - therapeutic exercise, therapeutic activities and home program  Impaired muscle performance - biofeedback, neuro re-education and home program    Therapy Evaluation Codes:   1) History comprised of:   Personal factors that impact the plan of care:      None.    Comorbidity factors that impact the plan of care are:      Cancer, Diabetes, High blood pressure, Overweight and Smoking.     Medications impacting care: High blood pressure.  2) Examination of Body Systems comprised of:   Body structures and functions that impact the plan of care:      Pelvis.   Activity limitations that impact the plan of care are:      Urinary incontinence.  3) Clinical presentation characteristics are:   Stable/Uncomplicated.  4) Decision-Making    Low complexity using standardized patient assessment instrument and/or measureable assessment of functional outcome.  Cumulative Therapy Evaluation is: Low complexity.    Previous and current functional limitations:  (See Goal Flow Sheet for this information)    Short term and Long term goals: (See Goal Flow Sheet for this information)     Communication ability:  Patient appears to be able to clearly communicate and understand verbal and written communication and follow directions correctly.  Treatment Explanation - The following has been discussed with the patient:   RX ordered/plan of  care  Anticipated outcomes  Possible risks and side effects  This patient would benefit from PT intervention to resume normal activities.   Rehab potential is good.    Frequency:  1 X week, once daily  Duration:  for 6 weeks  Discharge Plan:  Achieve all LTG.  Independent in home treatment program.  Reach maximal therapeutic benefit.    Please refer to the daily flowsheet for treatment today, total treatment time and time spent performing 1:1 timed codes.

## 2022-10-07 NOTE — PROGRESS NOTES
[facility]    OUTPATIENT Physical Therapy ORTHOPEDIC EVALUATION  PLAN OF TREATMENT FOR OUTPATIENT REHABILITATION  (COMPLETE FOR INITIAL CLAIMS ONLY)  Patient's Last Name, First Name, M.I.  YOB: 1950  Tunde Albarado    Provider s Name:  [facility]   Medical Record No.  [id number]   Start of Care Date:  10/07/22   Onset Date:   07/07/22   Treatment Diagnosis:  post op incontinence Medical Diagnosis:  Stress incontinence, male       Goals:     10/07/22 0500   Body Part   Goals listed below are for pelvic   Other Goal   Activity incontinence   Previous Functional Level no incontinence   Current Functional Level pt using 2 Depends per day   STG Target Performance pt using 1 Depends per day   Rationale decreased incontinence   Due Date 11/10/22   LTG Target Performance pt using 1 pad per day   Rationale decreased incontinence   Due Date 12/08/22       Therapy Frequency:  1x/week  Predicted Duration of Therapy Intervention:  6 weeks    Christopher Hannon, PT                 I CERTIFY THE NEED FOR THESE SERVICES FURNISHED UNDER        THIS PLAN OF TREATMENT AND WHILE UNDER MY CARE     (Physician attestation of this document indicates review and certification of the therapy plan).                     Certification Date From:  10/07/22   Certification Date To:  01/04/23    Referring Provider:  Fifi Au    Initial Assessment        See Epic Evaluation SOC Date: 10/07/22

## 2022-10-11 NOTE — PROGRESS NOTES
[facility]    OUTPATIENT Physical Therapy ORTHOPEDIC EVALUATION  PLAN OF TREATMENT FOR OUTPATIENT REHABILITATION  (COMPLETE FOR INITIAL CLAIMS ONLY)  Patient's Last Name, First Name, M.I.  YOB: 1950  Tunde Albarado    Provider s Name:  [facility]   Medical Record No.  [id number]   Start of Care Date:  10/07/22   Onset Date:   07/07/22   Treatment Diagnosis:  post op incontinence Medical Diagnosis:  Stress incontinence, male       Goals:     10/07/22 0500   Body Part   Goals listed below are for pelvic   Other Goal   Activity incontinence   Previous Functional Level no incontinence   Current Functional Level pt using 2 Depends per day   STG Target Performance pt using 1 Depends per day   Rationale decreased incontinence   Due Date 11/10/22   LTG Target Performance pt using 1 pad per day   Rationale decreased incontinence   Due Date 12/08/22       Therapy Frequency:  1x/week  Predicted Duration of Therapy Intervention:  6 weeks    Christopher Hannon, PT                 I CERTIFY THE NEED FOR THESE SERVICES FURNISHED UNDER        THIS PLAN OF TREATMENT AND WHILE UNDER MY CARE     (Physician attestation of this document indicates review and certification of the therapy plan).                     Certification Date From:  10/07/22   Certification Date To:  01/04/23    Referring Provider:  Fifi Au    Initial Assessment        See Epic Evaluation SOC Date: 10/07/22

## 2022-10-19 ENCOUNTER — PRE VISIT (OUTPATIENT)
Dept: UROLOGY | Facility: CLINIC | Age: 72
End: 2022-10-19

## 2022-10-19 NOTE — TELEPHONE ENCOUNTER
Reason for visit: Follow up     Relevant information: stress incontinence    Records/imaging/labs/orders: in epic    Pt called: no    At Rooming: virtual

## 2022-10-26 ENCOUNTER — THERAPY VISIT (OUTPATIENT)
Dept: PHYSICAL THERAPY | Facility: CLINIC | Age: 72
End: 2022-10-26
Payer: MEDICARE

## 2022-10-26 DIAGNOSIS — N39.3 STRESS INCONTINENCE, MALE: Primary | ICD-10-CM

## 2022-10-26 PROCEDURE — 97110 THERAPEUTIC EXERCISES: CPT | Mod: GP | Performed by: PHYSICAL THERAPIST

## 2022-10-26 PROCEDURE — 97112 NEUROMUSCULAR REEDUCATION: CPT | Mod: GP | Performed by: PHYSICAL THERAPIST

## 2022-10-26 NOTE — PROGRESS NOTES
Subjective:  HPI  Physical Exam                    Objective:  System    Physical Exam    General     ROS    Assessment/Plan:    SUBJECTIVE  Subjective changes as noted by pt:  Pt notes increased strength and decreased incontinence     Current pain level: 0/10     Changes in function:  Pt still notes leaking when leaning forward to tie shoes     Adverse reaction to treatment or activity:  None    OBJECTIVE  Changes in objective findings:  Pt demonstrates improved Kegel in supine and sitting. Pt notes some difficulty with holding Kegel while rising from chair.         ASSESSMENT  Tunde continues to require intervention to meet STG and LTG's: PT  Patient's symptoms are resolving.  Response to therapy has shown an improvement in  strength  Progress made towards STG/LTG?  Yes,     PLAN  Current treatment program is being advanced to more complex exercises.    PTA/ATC plan:  N/A    Please refer to the daily flowsheet for treatment today, total treatment time and time spent performing 1:1 timed codes.

## 2022-10-27 ENCOUNTER — VIRTUAL VISIT (OUTPATIENT)
Dept: UROLOGY | Facility: CLINIC | Age: 72
End: 2022-10-27
Payer: COMMERCIAL

## 2022-10-27 DIAGNOSIS — N39.46 MIXED STRESS AND URGE URINARY INCONTINENCE: Primary | ICD-10-CM

## 2022-10-27 PROCEDURE — 99213 OFFICE O/P EST LOW 20 MIN: CPT | Mod: 95 | Performed by: PHYSICIAN ASSISTANT

## 2022-10-27 NOTE — LETTER
10/27/2022       RE: Tunde Albarado  [address]  [facility] [address]     Dear Colleague,    Thank you for referring your patient, Tunde Albarado, to the [facility] UROLOGY CLINIC [place] at [facility]. Please see a copy of my visit note below.    HPI: Mr. Tunde Albarado is a 72 year old year old male presenting today for evaluation of chief complaint(s): Follow Up    Today, he is unaccompanied     Mr. Albarado has PMH significant for HTN, HLD, DM and prostate cancer s/p RALP with [facility] (Dr. Peace) on 6/2/17.  Path showed: Ami 7 (3+4) adenoca, pT2N0 with no EPE, SVI, LVI, PNI, and the margins were negative.     He was last seen 7/7/22 with an undetectable PSA.  Persistent stable incontinence - 1ppd.  We did send him back for PFPT with plans to refer to Dr. Turk or Cheko if bothersome incontinence persisted.  For PSA monitoring, he wanted to follow up with his PCP annually.     Today he returns in virtual followup.  He has begun seeing a new physical therapist, Christopher Hannon.  Although they have only had 2 sessions together, he is already noticing immense benefit.  He is feeling optimistic that his remaining incontinence will resolve.      Current Outpatient Medications   Medication Sig Dispense Refill     amLODIPine (NORVASC) 5 MG tablet Take 5 mg by mouth daily  3     aspirin (ASA) 81 MG chewable tablet Take 81 mg by mouth daily       cholecalciferol (VITAMIN D3) 125 mcg (5000 units) capsule TAKE ONE CAPSULE BY MOUTH ONCE DAILY       losartan-hydrochlorothiazide (HYZAAR) 100-12.5 MG tablet Take 1 tablet by mouth daily (Patient not taking: Reported on 7/7/2022)       metFORMIN (GLUCOPHAGE) 500 MG tablet        oxybutynin ER (DITROPAN XL) 15 MG 24 hr tablet Take 1 tablet (15 mg) by mouth daily 90 tablet 3     rosuvastatin (CRESTOR) 20 MG tablet        valsartan (DIOVAN) 320 MG tablet Take 320 mg by mouth daily         ALLERGIES: Patient has no known  allergies.      REVIEW OF SYSTEMS:  As above in HPI    GENERAL PHYSICAL EXAM: Virtual visit no vitals  Vitals: There were no vitals taken for this visit.  There is no height or weight on file to calculate BMI.    GENERAL: Well groomed, well developed, well nourished male in NAD.  NEURO: Alert and oriented x 3.  PSYCH: Normal mood and affect, pleasant and agreeable during interview and exam.    LABS: The last test results for Mr. Tunde Albarado were reviewed:  PSA - No results found for: PSA  BMP -   Recent Labs   Lab Test 07/04/19  0147 06/03/17  0803 06/02/17 2112    140 140   POTASSIUM 4.3 4.4 4.6   CHLORIDE 99 108 107   CO2 28 25 25   BUN 32* 21 20   CR 1.41* 1.25 1.31*   * 122* 212*   ISABELA 10.2 8.2* 8.4*   MAG 2.2  --   --        CBC -   Recent Labs   Lab Test 07/04/19 0147 06/03/17  0803 06/02/17 2112   WBC 14.5* 11.1* 12.0*   HGB 14.9 10.7* 11.6*    147* 179       ASSESSMENT:   1) prostate cancer  s/p RALP with [facility] (Dr. Peace) on 6/2/17.  Path showed: Ami 7 (3+4), pT2N0, negative margins - PSA remains undetectable according to patient  2) overactive bladder - on oxybutynin ER 15mg daily  3) PPI - mild leak with coughing.     PLAN:   - Continue seeing pelvic floor PT.  I am so glad this has been helpful so far  - Return in 4 months to re-evaluate.  At that time could consider surgical options for urinary incontinence if you are still bothered.      VIDEO DURATION: 11 minutes (  1:40 - 1:46 + 5 minutes documentation on DOS)    Bobbi Au PA-C  Department of Urologic Surgery      Evan is a 72 year old who is being evaluated via a billable video visit.      How would you like to obtain your AVS? Mail a copy  If the video visit is dropped, the invitation should be resent by: Text to cell phone: [phone]  Will anyone else be joining your video visit? No    Video-Visit Details    Video Start Time: 2:40    Type of service:  Video Visit    Video End Time:1:46    Originating Location  (pt. Location): Home    Distant Location (provider location):  On-site    Platform used for Video Visit: Doximity - patient didn't have the technology to allow Amwel  VIDEO DURATION: 11 minutes (  1:40 - 1:46 + 5 minutes documentation on DOS)

## 2022-10-27 NOTE — PROGRESS NOTES
HPI: Mr. Tunde Albarado is a 72 year old year old male presenting today for evaluation of chief complaint(s): Follow Up    Today, he is unaccompanied     Mr. Albarado has PMH significant for HTN, HLD, DM and prostate cancer s/p RALP with [facility] (Dr. Peace) on 6/2/17.  Path showed: [place] 7 (3+4) adenoca, pT2N0 with no EPE, SVI, LVI, PNI, and the margins were negative.     He was last seen 7/7/22 with an undetectable PSA.  Persistent stable incontinence - 1ppd.  We did send him back for PFPT with plans to refer to Dr. Turk or Cheko if bothersome incontinence persisted.  For PSA monitoring, he wanted to follow up with his PCP annually.     Today he returns in virtual followup.  He has begun seeing a new physical therapist, Christopher Hannon.  Although they have only had 2 sessions together, he is already noticing immense benefit.  He is feeling optimistic that his remaining incontinence will resolve.      Current Outpatient Medications   Medication Sig Dispense Refill     amLODIPine (NORVASC) 5 MG tablet Take 5 mg by mouth daily  3     aspirin (ASA) 81 MG chewable tablet Take 81 mg by mouth daily       cholecalciferol (VITAMIN D3) 125 mcg (5000 units) capsule TAKE ONE CAPSULE BY MOUTH ONCE DAILY       losartan-hydrochlorothiazide (HYZAAR) 100-12.5 MG tablet Take 1 tablet by mouth daily (Patient not taking: Reported on 7/7/2022)       metFORMIN (GLUCOPHAGE) 500 MG tablet        oxybutynin ER (DITROPAN XL) 15 MG 24 hr tablet Take 1 tablet (15 mg) by mouth daily 90 tablet 3     rosuvastatin (CRESTOR) 20 MG tablet        valsartan (DIOVAN) 320 MG tablet Take 320 mg by mouth daily         ALLERGIES: Patient has no known allergies.      REVIEW OF SYSTEMS:  As above in HPI    GENERAL PHYSICAL EXAM: Virtual visit no vitals  Vitals: There were no vitals taken for this visit.  There is no height or weight on file to calculate BMI.    GENERAL: Well groomed, well developed, well nourished male in NAD.  NEURO: Alert and oriented x  3.  PSYCH: Normal mood and affect, pleasant and agreeable during interview and exam.    LABS: The last test results for Mr. Tunde Albarado were reviewed:  PSA - No results found for: PSA  BMP -   Recent Labs   Lab Test 07/04/19  0147 06/03/17  0803 06/02/17 2112    140 140   POTASSIUM 4.3 4.4 4.6   CHLORIDE 99 108 107   CO2 28 25 25   BUN 32* 21 20   CR 1.41* 1.25 1.31*   * 122* 212*   ISABELA 10.2 8.2* 8.4*   MAG 2.2  --   --        CBC -   Recent Labs   Lab Test 07/04/19  0147 06/03/17  0803 06/02/17 2112   WBC 14.5* 11.1* 12.0*   HGB 14.9 10.7* 11.6*    147* 179       ASSESSMENT:   1) prostate cancer  s/p RALP with [facility] (Dr. Peace) on 6/2/17.  Path showed: [place] 7 (3+4), pT2N0, negative margins - PSA remains undetectable according to patient  2) overactive bladder - on oxybutynin ER 15mg daily  3) PPI - mild leak with coughing.     PLAN:   - Continue seeing pelvic floor PT.  I am so glad this has been helpful so far  - Return in 4 months to re-evaluate.  At that time could consider surgical options for urinary incontinence if you are still bothered.      VIDEO DURATION: 11 minutes (  1:40 - 1:46 + 5 minutes documentation on DOS)    Bobbi Au PA-C  Department of Urologic Surgery

## 2022-10-27 NOTE — PROGRESS NOTES
Evna is a 72 year old who is being evaluated via a billable video visit.      How would you like to obtain your AVS? Mail a copy  If the video visit is dropped, the invitation should be resent by: Text to cell phone: [phone]  Will anyone else be joining your video visit? No    Video-Visit Details    Video Start Time: 2:40    Type of service:  Video Visit    Video End Time:1:46    Originating Location (pt. Location): Home    Distant Location (provider location):  On-site    Platform used for Video Visit: Doximity - patient didn't have the technology to allow Amwel  VIDEO DURATION: 11 minutes (  1:40 - 1:46 + 5 minutes documentation on DOS)

## 2022-10-27 NOTE — PROGRESS NOTES
Tried to do PFPT, but the therapist was sick on the day he was supposed to start.But he finally has begun the process and

## 2022-11-09 ENCOUNTER — THERAPY VISIT (OUTPATIENT)
Dept: PHYSICAL THERAPY | Facility: CLINIC | Age: 72
End: 2022-11-09
Payer: MEDICARE

## 2022-11-09 DIAGNOSIS — N39.3 STRESS INCONTINENCE, MALE: Primary | ICD-10-CM

## 2022-11-09 PROCEDURE — 97112 NEUROMUSCULAR REEDUCATION: CPT | Mod: GP | Performed by: PHYSICAL THERAPIST

## 2022-11-09 PROCEDURE — 97110 THERAPEUTIC EXERCISES: CPT | Mod: GP | Performed by: PHYSICAL THERAPIST

## 2022-11-09 NOTE — PROGRESS NOTES
Subjective:  HPI  Physical Exam                    Objective:  System    Physical Exam    General     ROS    Assessment/Plan:    SUBJECTIVE  Subjective changes as noted by pt:  Pt notes increased strength     Current pain level: 0/10     Changes in function:  Pt is using 1 Depends. Pt notes muscle tiring out around 4 PM with increased incontinence later in the day     Adverse reaction to treatment or activity:  None    OBJECTIVE  Changes in objective findings:  Pt shows fatigue with  Pelvic floor contraction around 4 seconds into the contraction. Pt shows sustained contraction with ambulation, rowing and push ups.        ASSESSMENT  Tunde continues to require intervention to meet STG and LTG's: PT  Patient's symptoms are resolving.  Response to therapy has shown an improvement in  strength and incontinence  Progress made towards STG/LTG?  Yes,     PLAN  Current treatment program is being advanced to more complex exercises.    PTA/ATC plan:  N/A    Please refer to the daily flowsheet for treatment today, total treatment time and time spent performing 1:1 timed codes.

## 2022-12-07 ENCOUNTER — THERAPY VISIT (OUTPATIENT)
Dept: PHYSICAL THERAPY | Facility: CLINIC | Age: 72
End: 2022-12-07
Payer: MEDICARE

## 2022-12-07 DIAGNOSIS — N39.3 STRESS INCONTINENCE, MALE: Primary | ICD-10-CM

## 2022-12-07 PROCEDURE — 90912 BFB TRAINING 1ST 15 MIN: CPT | Mod: GP | Performed by: PHYSICAL THERAPIST

## 2022-12-07 PROCEDURE — 90913 BFB TRAINING EA ADDL 15 MIN: CPT | Mod: GP | Performed by: PHYSICAL THERAPIST

## 2022-12-07 NOTE — PROGRESS NOTES
Subjective:  HPI  Physical Exam                    Objective:  System    Physical Exam    General     ROS    Assessment/Plan:    SUBJECTIVE  Subjective changes as noted by pt:  Pt reports being absent secondary to having COVID     Current pain level: 0/10     Changes in function:  Pt notes leaking with coughing otherwise doing well     Adverse reaction to treatment or activity:  None    OBJECTIVE  Changes in objective findings:  Pt shows strong pelvic floor contraction with ambulation, marching, push ups and rowing        ASSESSMENT  Tunde continues to require intervention to meet STG and LTG's: PT  Patient's symptoms are resolving.  Response to therapy has shown an improvement in  incontinence  Progress made towards STG/LTG?  Yes,     PLAN  Current treatment program is being advanced to more complex exercises.    PTA/ATC plan:  N/A    Please refer to the daily flowsheet for treatment today, total treatment time and time spent performing 1:1 timed codes.

## 2023-01-25 ENCOUNTER — THERAPY VISIT (OUTPATIENT)
Dept: PHYSICAL THERAPY | Facility: CLINIC | Age: 73
End: 2023-01-25
Payer: MEDICARE

## 2023-01-25 DIAGNOSIS — N39.3 STRESS INCONTINENCE, MALE: Primary | ICD-10-CM

## 2023-01-25 PROCEDURE — 90912 BFB TRAINING 1ST 15 MIN: CPT | Mod: GP | Performed by: PHYSICAL THERAPIST

## 2023-01-25 PROCEDURE — 90913 BFB TRAINING EA ADDL 15 MIN: CPT | Mod: GP | Performed by: PHYSICAL THERAPIST

## 2023-01-25 NOTE — PROGRESS NOTES
Subjective:  HPI  Physical Exam                    Objective:  System    Physical Exam    General     ROS    Assessment/Plan:    DISCHARGE REPORT    Progress reporting period is from 10-7-22 to 1-25-23.       SUBJECTIVE  Subjective changes noted by patient:  Pt notes increased strength and decreased incontinence. Pt feels comfortable continuing with exercises at home.       Current pain level is 0/10  .     Previous pain level was  0/10  .   Changes in function:  Pt is using 1 Depends per day. Pt is waking 1x/night to urinate. Pt notes some leaking with activity.   Adverse reaction to treatment or activity: None    OBJECTIVE  Changes noted in objective findings:  Pt demonstrates improved strength with Kegels. Initial maximum contraction 8 mV final maximum contraction 26 mV. Pt is able to maintain a strong Kegel contraction with ambulation and rising from a chair.         ASSESSMENT/PLAN  Updated problem list and treatment plan: Diagnosis 1:  Post op incontinence  Decreased strength - therapeutic exercise, therapeutic activities and home program  Impaired muscle performance - biofeedback, neuro re-education and home program  STG/LTGs have been met or progress has been made towards goals:  Yes,   Assessment of Progress: The patient's condition is improving.  Self Management Plans:  Patient has been instructed in a home treatment program.  I have re-evaluated this patient and find that the nature, scope, duration and intensity of the therapy is appropriate for the medical condition of the patient.  Tunde continues to require the following intervention to meet STG and LTG's:  PT intervention is no longer required to meet STG/LTG.    Recommendations:  This patient is ready to be discharged from therapy and continue their home treatment program.    Please refer to the daily flowsheet for treatment today, total treatment time and time spent performing 1:1 timed codes.

## 2023-01-26 ENCOUNTER — VIRTUAL VISIT (OUTPATIENT)
Dept: UROLOGY | Facility: CLINIC | Age: 73
End: 2023-01-26
Payer: COMMERCIAL

## 2023-01-26 DIAGNOSIS — N39.46 MIXED STRESS AND URGE URINARY INCONTINENCE: ICD-10-CM

## 2023-01-26 PROCEDURE — 99213 OFFICE O/P EST LOW 20 MIN: CPT | Mod: 95 | Performed by: PHYSICIAN ASSISTANT

## 2023-01-26 RX ORDER — OXYBUTYNIN CHLORIDE 15 MG/1
15 TABLET, EXTENDED RELEASE ORAL DAILY
Qty: 90 TABLET | Refills: 3 | Status: SHIPPED | OUTPATIENT
Start: 2023-01-26 | End: 2024-02-27

## 2023-01-26 NOTE — PROGRESS NOTES
Evan is a 72 year old who is being evaluated via a billable video visit.      How would you like to obtain your AVS? Story[employer]  If the video visit is dropped, the invitation should be resent by: Text to cell phone: [phone]  Will anyone else be joining your video visit? No    Video-Visit Details    Type of service:  Video Visit   Video Start Time: 1:37  Video End Time:1:44    Originating Location (pt. Location): Home  Distant Location (provider location):  On-site  Platform used for Video Visit: [employer]   VIDEO DURATION:  14 minutes (1:37 - 1:44 + 7 minutes documentation on DOS)

## 2023-01-26 NOTE — PATIENT INSTRUCTIONS
- Be sure to get your PSA checked once per year with your primary care provider. The PSA should remain zero - let me know if there are any concerns  - Continue home pelvic floor exercises.  Let me know if I can help in other ways to improve your urinary symptoms  - Return in 1 year to discuss urinary symptoms and oxybutynin ER.  Alternatively, your PCP could continue prescribing oxybutynin if he/she feels comfortable with that.     [facility]

## 2023-01-26 NOTE — PROGRESS NOTES
HPI: Mr. Tunde Albarado is a 72 year old year old male presenting today for evaluation of chief complaint(s): Follow Up    Today, he is unaccompanied on video    Mr. Albarado has PMH significant for HTN, HLD, DM and prostate cancer s/p RALP with [facility] (Dr. Peace) on 6/2/17.  Path showed: Ami 7 (3+4) adenoca, pT2N0 with no EPE, SVI, LVI, PNI, and the margins were negative.      - 7/7/22 with an undetectable PSA (6/29/22 <0.04ug/L).  Persistent stable incontinence - 1ppd.  We did send him back for PFPT with plans to refer to Dr. Turk or David if bothersome incontinence persisted.  For PSA monitoring, he wanted to follow up with his PCP annually.    - 10/27/22 - continuing PFPT with good success    Today he returns in virtual followup,  He last saw the therapist yesterday and was tentatively discharged from his care.  In 2-3 weeks if he is still having problems he will schedule another appointment. Currently doing exercises twice per day.  In a few months will decrease exercises to three times per week.  Is still using the same number of pads but is really noticing a difference in urinary control.      Has an appointment with his PCP in June 2023 and will recheck PSA at that time.    Health has otherwise been stable.  Recently had a 6 month checkup for his DM - his A1C was 6.9% (down from 7.4%).  Blood pressure was down too (127/76).  In general, his PCP was very happy with everything.      Current Outpatient Medications   Medication Sig Dispense Refill     amLODIPine (NORVASC) 5 MG tablet Take 5 mg by mouth daily  3     aspirin (ASA) 81 MG chewable tablet Take 81 mg by mouth daily       cholecalciferol (VITAMIN D3) 125 mcg (5000 units) capsule TAKE ONE CAPSULE BY MOUTH ONCE DAILY       losartan-hydrochlorothiazide (HYZAAR) 100-12.5 MG tablet Take 1 tablet by mouth daily (Patient not taking: Reported on 7/7/2022)       metFORMIN (GLUCOPHAGE) 500 MG tablet        oxybutynin ER (DITROPAN XL) 15 MG 24 hr tablet Take  1 tablet (15 mg) by mouth daily 90 tablet 3     rosuvastatin (CRESTOR) 20 MG tablet        valsartan (DIOVAN) 320 MG tablet Take 320 mg by mouth daily         ALLERGIES: Patient has no known allergies.      REVIEW OF SYSTEMS:  As above in HPI     GENERAL PHYSICAL EXAM: VIRTUAL VISIT - no vitals    GENERAL: Well groomed, well developed, well nourished male in NAD.  NEURO: Alert and oriented x 3.  PSYCH: Normal mood and affect, pleasant and agreeable during interview and exam.    LABS: The last test results for Mr. Tunde Albarado were reviewed:  PSA - No results found for: PSA  BMP -   Recent Labs   Lab Test 07/04/19  0147 06/03/17  0803 06/02/17  2112    140 140   POTASSIUM 4.3 4.4 4.6   CHLORIDE 99 108 107   CO2 28 25 25   BUN 32* 21 20   CR 1.41* 1.25 1.31*   * 122* 212*   ISABELA 10.2 8.2* 8.4*   MAG 2.2  --   --        CBC -   Recent Labs   Lab Test 07/04/19 0147 06/03/17  0803 06/02/17  2112   WBC 14.5* 11.1* 12.0*   HGB 14.9 10.7* 11.6*    147* 179     ASSESSMENT:   1) prostate cancer  s/p RALP with [facility] (Dr. Peace) on 6/2/17.  Path showed: Ami 7 (3+4), pT2N0, negative margins - PSA remains undetectable - ROHITH  2) overactive bladder - on oxybutynin ER 15mg daily  3) PPI - mild leak with coughing.     PLAN:   - Be sure to get your PSA checked once per year with your primary care provider. The PSA should remain zero - let me know if there are any concerns  - Continue home pelvic floor exercises.  Let me know if I can help in other ways to improve your urinary symptoms  - Refilled oxybutynin ER 15mg daily   - Return in 1 year to discuss urinary symptoms and oxybutynin ER.  Alternatively, your PCP could continue prescribing oxybutynin if he/she feels comfortable with that.     VIDEO DURATION:  14 minutes (1:37 - 1:44 + 7 minutes documentation on DOS)    Bobbi Au PA-C  Department of Urologic Surgery

## 2023-01-26 NOTE — LETTER
1/26/2023       RE: Tunde Albarado  [address]  [facility] [address]     Dear Colleague,    Thank you for referring your patient, Tunde Albarado, to the [facility] UROLOGY CLINIC [place] at [facility]. Please see a copy of my visit note below.    Evan is a 72 year old who is being evaluated via a billable video visit.      How would you like to obtain your AVS? MyChart  If the video visit is dropped, the invitation should be resent by: Text to cell phone: [phone]  Will anyone else be joining your video visit? No    Video-Visit Details    Type of service:  Video Visit   Video Start Time: 1:37  Video End Time:1:44    Originating Location (pt. Location): Home  Distant Location (provider location):  On-site  Platform used for Video Visit: [employer]   VIDEO DURATION:  14 minutes (1:37 - 1:44 + 7 minutes documentation on DOS)    HPI: Mr. Tunde Albarado is a 72 year old year old male presenting today for evaluation of chief complaint(s): Follow Up    Today, he is unaccompanied on video    Mr. Albarado has PMH significant for HTN, HLD, DM and prostate cancer s/p RALP with [facility] (Dr. Peace) on 6/2/17.  Path showed: Ami 7 (3+4) adenoca, pT2N0 with no EPE, SVI, LVI, PNI, and the margins were negative.      - 7/7/22 with an undetectable PSA (6/29/22 <0.04ug/L).  Persistent stable incontinence - 1ppd.  We did send him back for PFPT with plans to refer to Dr. Turk or David if bothersome incontinence persisted.  For PSA monitoring, he wanted to follow up with his PCP annually.    - 10/27/22 - continuing PFPT with good success    Today he returns in virtual followup,  He last saw the therapist yesterday and was tentatively discharged from his care.  In 2-3 weeks if he is still having problems he will schedule another appointment. Currently doing exercises twice per day.  In a few months will decrease exercises to three times per week.  Is still using the same  number of pads but is really noticing a difference in urinary control.      Has an appointment with his PCP in June 2023 and will recheck PSA at that time.    Health has otherwise been stable.  Recently had a 6 month checkup for his DM - his A1C was 6.9% (down from 7.4%).  Blood pressure was down too (127/76).  In general, his PCP was very happy with everything.      Current Outpatient Medications   Medication Sig Dispense Refill     amLODIPine (NORVASC) 5 MG tablet Take 5 mg by mouth daily  3     aspirin (ASA) 81 MG chewable tablet Take 81 mg by mouth daily       cholecalciferol (VITAMIN D3) 125 mcg (5000 units) capsule TAKE ONE CAPSULE BY MOUTH ONCE DAILY       losartan-hydrochlorothiazide (HYZAAR) 100-12.5 MG tablet Take 1 tablet by mouth daily (Patient not taking: Reported on 7/7/2022)       metFORMIN (GLUCOPHAGE) 500 MG tablet        oxybutynin ER (DITROPAN XL) 15 MG 24 hr tablet Take 1 tablet (15 mg) by mouth daily 90 tablet 3     rosuvastatin (CRESTOR) 20 MG tablet        valsartan (DIOVAN) 320 MG tablet Take 320 mg by mouth daily         ALLERGIES: Patient has no known allergies.      REVIEW OF SYSTEMS:  As above in HPI     GENERAL PHYSICAL EXAM: VIRTUAL VISIT - no vitals    GENERAL: Well groomed, well developed, well nourished male in NAD.  NEURO: Alert and oriented x 3.  PSYCH: Normal mood and affect, pleasant and agreeable during interview and exam.    LABS: The last test results for Mr. Tunde Albarado were reviewed:  PSA - No results found for: PSA  BMP -   Recent Labs   Lab Test 07/04/19 0147 06/03/17 0803 06/02/17 2112    140 140   POTASSIUM 4.3 4.4 4.6   CHLORIDE 99 108 107   CO2 28 25 25   BUN 32* 21 20   CR 1.41* 1.25 1.31*   * 122* 212*   ISABELA 10.2 8.2* 8.4*   MAG 2.2  --   --        CBC -   Recent Labs   Lab Test 07/04/19 0147 06/03/17  0803 06/02/17 2112   WBC 14.5* 11.1* 12.0*   HGB 14.9 10.7* 11.6*    147* 179     ASSESSMENT:   1) prostate cancer  s/p RALP with BPLND (  [facility]) on 6/2/17.  Path showed: [place] 7 (3+4), pT2N0, negative margins - PSA remains undetectable - ROHITH  2) overactive bladder - on oxybutynin ER 15mg daily  3) PPI - mild leak with coughing.     PLAN:   - Be sure to get your PSA checked once per year with your primary care provider. The PSA should remain zero - let me know if there are any concerns  - Continue home pelvic floor exercises.  Let me know if I can help in other ways to improve your urinary symptoms  - Refilled oxybutynin ER 15mg daily   - Return in 1 year to discuss urinary symptoms and oxybutynin ER.  Alternatively, your PCP could continue prescribing oxybutynin if he/she feels comfortable with that.     VIDEO DURATION:  14 minutes (1:37 - 1:44 + 7 minutes documentation on DOS)    Bobbi Au PA-C  Department of Urologic Surgery

## 2023-06-29 ENCOUNTER — TRANSFERRED RECORDS (OUTPATIENT)
Dept: HEALTH INFORMATION MANAGEMENT | Facility: CLINIC | Age: 73
End: 2023-06-29
Payer: COMMERCIAL

## 2024-02-21 DIAGNOSIS — N39.46 MIXED STRESS AND URGE URINARY INCONTINENCE: ICD-10-CM

## 2024-02-27 ENCOUNTER — TELEPHONE (OUTPATIENT)
Dept: UROLOGY | Facility: CLINIC | Age: 74
End: 2024-02-27
Payer: COMMERCIAL

## 2024-02-27 RX ORDER — OXYBUTYNIN CHLORIDE 15 MG/1
15 TABLET, EXTENDED RELEASE ORAL DAILY
Qty: 30 TABLET | Refills: 0 | Status: SHIPPED | OUTPATIENT
Start: 2024-02-27

## 2024-02-27 NOTE — TELEPHONE ENCOUNTER
"Spoke with pt to schedule an in person appointment for an \"annual exam\" per message received, but pt declined to schedule with Urology and stated gets his annual at his primary care. Advised if he wants to schedule can see any PA with urology for a follow up if needed  "

## 2024-02-27 NOTE — TELEPHONE ENCOUNTER
Writer requested clinic coordinator call patient to schedule annual exam,     Thank you,  LENORA NavaN RN-Triage-Urology

## 2024-03-20 DIAGNOSIS — N39.46 MIXED STRESS AND URGE URINARY INCONTINENCE: ICD-10-CM

## 2024-03-26 RX ORDER — OXYBUTYNIN CHLORIDE 15 MG/1
15 TABLET, EXTENDED RELEASE ORAL DAILY
Qty: 30 TABLET | Refills: 0 | OUTPATIENT
Start: 2024-03-26

## 2024-03-27 DIAGNOSIS — N39.46 MIXED STRESS AND URGE URINARY INCONTINENCE: ICD-10-CM

## 2024-04-04 RX ORDER — OXYBUTYNIN CHLORIDE 15 MG/1
15 TABLET, EXTENDED RELEASE ORAL DAILY
Qty: 30 TABLET | Refills: 0 | OUTPATIENT
Start: 2024-04-04

## 2024-07-01 ENCOUNTER — TRANSFERRED RECORDS (OUTPATIENT)
Dept: HEALTH INFORMATION MANAGEMENT | Facility: CLINIC | Age: 74
End: 2024-07-01
Payer: COMMERCIAL

## (undated) DEVICE — GLOVE PROTEXIS MICRO 7.0  2D73PM70

## (undated) DEVICE — WIPES FOLEY CARE SURESTEP PROVON DFC100

## (undated) DEVICE — SU MONOCRYL 4-0 PS-2 27" UND Y426H

## (undated) DEVICE — ESU GROUND PAD ADULT REM W/15' CORD E7507DB

## (undated) DEVICE — DAVINCI XI SEAL UNIVERSAL 5-8MM 470361

## (undated) DEVICE — DAVINCI XI NDL DRIVER LARGE 470006

## (undated) DEVICE — CATH FOLYSIL 18FR 15ML AA6118

## (undated) DEVICE — DAVINCI XI DRAPE ARM 470015

## (undated) DEVICE — DAVINCI XI MONOPOLAR SCISSORS HOT SHEARS 8MM 470179

## (undated) DEVICE — PACK GOWN 3/PK DISP XL SBA32GPFCB

## (undated) DEVICE — TUBING CONMED AIRSEAL SMOKE EVAC INSUFFLATION ASM-EVAC

## (undated) DEVICE — SU WND CLOSURE VLOC 180 ABS 3-0 6" V-20 VLOCL0604

## (undated) DEVICE — CLIP ENDO HEMO-LOC PURPLE LG 544240

## (undated) DEVICE — DECANTER VIAL 2006S

## (undated) DEVICE — SU MONOCRYL 3-0 RB-1 27" Y305H

## (undated) DEVICE — SOL WATER IRRIG 1000ML BOTTLE 07139-09

## (undated) DEVICE — SUCTION MANIFOLD DORNOCH ULTRA CART UL-CL500

## (undated) DEVICE — DAVINCI XI DRAPE COLUMN 470341

## (undated) DEVICE — SU VICRYL 0 UR-6 27" J603H

## (undated) DEVICE — DAVINCI XI GRASPER ENDOWRIST PROGRASP 470093

## (undated) DEVICE — DAVINCI XI FCP BIPOLAR FENESTRATED 470205

## (undated) DEVICE — PREP CHLORAPREP 26ML TINTED ORANGE  260815

## (undated) DEVICE — SUCTION IRR STRYKERFLOW II W/TIP 250-070-520

## (undated) DEVICE — CLIP ABSORBABLE LAPRO-CLIP 12MM 8886848812

## (undated) DEVICE — SU VICRYL 3-0 SH 27" J316H

## (undated) DEVICE — PACK DAVINCI UROL

## (undated) DEVICE — LINEN TOWEL PACK X6 WHITE 5487

## (undated) DEVICE — PROTECTOR ARM ONE-STEP TRENDELENBURG 40418

## (undated) DEVICE — SU DERMABOND ADVANCED .7ML DNX12

## (undated) DEVICE — LINEN TOWEL PACK X30 5481

## (undated) DEVICE — KIT PATIENT POSITIONING PIGAZZI LATEX FREE 40580

## (undated) DEVICE — ENDO TROCAR CONMED AIRSEAL BLADELESS 12X120MM IAS12-120LP

## (undated) DEVICE — BLADE CLIPPER SGL USE 9680

## (undated) DEVICE — SU ETHILON 2-0 FS 18" 664H

## (undated) DEVICE — PREP POVIDONE IODINE SCRUB 7.5% 120ML

## (undated) DEVICE — Device

## (undated) DEVICE — DRSG XEROFORM 5X9" CUR253590W

## (undated) DEVICE — SU MONOCRYL 3-0 RB-1 27" UND Y215H

## (undated) DEVICE — PREP POVIDONE IODINE SOLUTION 10% 120ML

## (undated) DEVICE — ESU PENCIL W/COATED BLADE E2450H

## (undated) DEVICE — DAVINCI HOT SHEARS TIP COVER  400180

## (undated) DEVICE — GLOVE ESTEEM BLUE W/NEU-THERA 7.5  2D73PB75

## (undated) DEVICE — DRAIN JACKSON PRATT CHANNEL 19FR ROUND HUBLESS SIL JP-2230

## (undated) DEVICE — SOL NACL 0.9% INJ 1000ML BAG 07983-09

## (undated) DEVICE — BAG SPECIMEN NYLON MEMORY WIRE 3"X6" SB1036

## (undated) DEVICE — DRAPE IOBAN INCISE 13X13" 6640EZ

## (undated) DEVICE — SOL WATER IRRIG 1000ML BOTTLE 2F7114

## (undated) DEVICE — DRAIN JACKSON PRATT RESERVOIR 100ML SU130-1305

## (undated) DEVICE — SYSTEM CLEARIFY VISUALIZATION 21-345

## (undated) DEVICE — ENDO TROCAR 05MM VERSAONE BLADELESS W/STD FIX CAN NONB5STF

## (undated) RX ORDER — HYDROMORPHONE HYDROCHLORIDE 1 MG/ML
INJECTION, SOLUTION INTRAMUSCULAR; INTRAVENOUS; SUBCUTANEOUS
Status: DISPENSED
Start: 2017-06-02

## (undated) RX ORDER — DEXAMETHASONE SODIUM PHOSPHATE 4 MG/ML
INJECTION, SOLUTION INTRA-ARTICULAR; INTRALESIONAL; INTRAMUSCULAR; INTRAVENOUS; SOFT TISSUE
Status: DISPENSED
Start: 2017-06-02

## (undated) RX ORDER — PHENYLEPHRINE HCL IN 0.9% NACL 1 MG/10 ML
SYRINGE (ML) INTRAVENOUS
Status: DISPENSED
Start: 2017-06-02

## (undated) RX ORDER — FENTANYL CITRATE 50 UG/ML
INJECTION, SOLUTION INTRAMUSCULAR; INTRAVENOUS
Status: DISPENSED
Start: 2017-06-02

## (undated) RX ORDER — BUPIVACAINE HYDROCHLORIDE 2.5 MG/ML
INJECTION, SOLUTION EPIDURAL; INFILTRATION; INTRACAUDAL
Status: DISPENSED
Start: 2017-06-02

## (undated) RX ORDER — PROPOFOL 10 MG/ML
INJECTION, EMULSION INTRAVENOUS
Status: DISPENSED
Start: 2017-06-02

## (undated) RX ORDER — ONDANSETRON 2 MG/ML
INJECTION INTRAMUSCULAR; INTRAVENOUS
Status: DISPENSED
Start: 2017-06-02

## (undated) RX ORDER — ALBUMIN, HUMAN INJ 5% 5 %
SOLUTION INTRAVENOUS
Status: DISPENSED
Start: 2017-06-02

## (undated) RX ORDER — SODIUM CHLORIDE 9 MG/ML
INJECTION, SOLUTION INTRAVENOUS
Status: DISPENSED
Start: 2019-07-04

## (undated) RX ORDER — EPHEDRINE SULFATE 50 MG/ML
INJECTION, SOLUTION INTRAMUSCULAR; INTRAVENOUS; SUBCUTANEOUS
Status: DISPENSED
Start: 2017-06-02

## (undated) RX ORDER — CEFAZOLIN SODIUM 1 G/3ML
INJECTION, POWDER, FOR SOLUTION INTRAMUSCULAR; INTRAVENOUS
Status: DISPENSED
Start: 2017-06-02

## (undated) RX ORDER — CIPROFLOXACIN 500 MG/1
TABLET, FILM COATED ORAL
Status: DISPENSED
Start: 2017-06-14

## (undated) RX ORDER — CEFAZOLIN SODIUM 2 G/100ML
INJECTION, SOLUTION INTRAVENOUS
Status: DISPENSED
Start: 2017-06-02

## (undated) RX ORDER — SODIUM CHLORIDE 9 MG/ML
INJECTION, SOLUTION INTRAVENOUS
Status: DISPENSED
Start: 2017-06-02

## (undated) RX ORDER — HEPARIN SODIUM 5000 [USP'U]/.5ML
INJECTION, SOLUTION INTRAVENOUS; SUBCUTANEOUS
Status: DISPENSED
Start: 2017-06-02

## (undated) RX ORDER — LIDOCAINE HYDROCHLORIDE 20 MG/ML
INJECTION, SOLUTION EPIDURAL; INFILTRATION; INTRACAUDAL; PERINEURAL
Status: DISPENSED
Start: 2017-06-02